# Patient Record
Sex: MALE | Race: OTHER | HISPANIC OR LATINO | URBAN - METROPOLITAN AREA
[De-identification: names, ages, dates, MRNs, and addresses within clinical notes are randomized per-mention and may not be internally consistent; named-entity substitution may affect disease eponyms.]

---

## 2017-01-05 ENCOUNTER — OUTPATIENT (OUTPATIENT)
Dept: OUTPATIENT SERVICES | Facility: HOSPITAL | Age: 67
LOS: 1 days | End: 2017-01-05
Payer: COMMERCIAL

## 2017-01-05 VITALS
TEMPERATURE: 98 F | DIASTOLIC BLOOD PRESSURE: 100 MMHG | SYSTOLIC BLOOD PRESSURE: 150 MMHG | RESPIRATION RATE: 18 BRPM | HEIGHT: 71 IN | WEIGHT: 195.99 LBS | HEART RATE: 63 BPM

## 2017-01-05 DIAGNOSIS — M25.562 PAIN IN LEFT KNEE: ICD-10-CM

## 2017-01-05 DIAGNOSIS — Z85.89 PERSONAL HISTORY OF MALIGNANT NEOPLASM OF OTHER ORGANS AND SYSTEMS: Chronic | ICD-10-CM

## 2017-01-05 DIAGNOSIS — S83.249A OTHER TEAR OF MEDIAL MENISCUS, CURRENT INJURY, UNSPECIFIED KNEE, INITIAL ENCOUNTER: ICD-10-CM

## 2017-01-05 DIAGNOSIS — R06.83 SNORING: ICD-10-CM

## 2017-01-05 DIAGNOSIS — Z01.818 ENCOUNTER FOR OTHER PREPROCEDURAL EXAMINATION: ICD-10-CM

## 2017-01-05 DIAGNOSIS — C44.91 BASAL CELL CARCINOMA OF SKIN, UNSPECIFIED: Chronic | ICD-10-CM

## 2017-01-05 DIAGNOSIS — I10 ESSENTIAL (PRIMARY) HYPERTENSION: ICD-10-CM

## 2017-01-05 LAB
BUN SERPL-MCNC: 24 MG/DL — HIGH (ref 7–23)
CALCIUM SERPL-MCNC: 9.7 MG/DL — SIGNIFICANT CHANGE UP (ref 8.4–10.5)
CHLORIDE SERPL-SCNC: 100 MMOL/L — SIGNIFICANT CHANGE UP (ref 98–107)
CO2 SERPL-SCNC: 27 MMOL/L — SIGNIFICANT CHANGE UP (ref 22–31)
CREAT SERPL-MCNC: 1.03 MG/DL — SIGNIFICANT CHANGE UP (ref 0.5–1.3)
GLUCOSE SERPL-MCNC: 85 MG/DL — SIGNIFICANT CHANGE UP (ref 70–99)
HCT VFR BLD CALC: 47.1 % — SIGNIFICANT CHANGE UP (ref 39–50)
HGB BLD-MCNC: 15.9 G/DL — SIGNIFICANT CHANGE UP (ref 13–17)
MCHC RBC-ENTMCNC: 30.1 PG — SIGNIFICANT CHANGE UP (ref 27–34)
MCHC RBC-ENTMCNC: 33.8 % — SIGNIFICANT CHANGE UP (ref 32–36)
MCV RBC AUTO: 89 FL — SIGNIFICANT CHANGE UP (ref 80–100)
PLATELET # BLD AUTO: 274 K/UL — SIGNIFICANT CHANGE UP (ref 150–400)
PMV BLD: 10.9 FL — SIGNIFICANT CHANGE UP (ref 7–13)
POTASSIUM SERPL-MCNC: 3.6 MMOL/L — SIGNIFICANT CHANGE UP (ref 3.5–5.3)
POTASSIUM SERPL-SCNC: 3.6 MMOL/L — SIGNIFICANT CHANGE UP (ref 3.5–5.3)
RBC # BLD: 5.29 M/UL — SIGNIFICANT CHANGE UP (ref 4.2–5.8)
RBC # FLD: 12.8 % — SIGNIFICANT CHANGE UP (ref 10.3–14.5)
SODIUM SERPL-SCNC: 142 MMOL/L — SIGNIFICANT CHANGE UP (ref 135–145)
WBC # BLD: 6.81 K/UL — SIGNIFICANT CHANGE UP (ref 3.8–10.5)
WBC # FLD AUTO: 6.81 K/UL — SIGNIFICANT CHANGE UP (ref 3.8–10.5)

## 2017-01-05 PROCEDURE — 93010 ELECTROCARDIOGRAM REPORT: CPT

## 2017-01-05 NOTE — H&P PST ADULT - PROBLEM SELECTOR PLAN 1
scheduled for left knee arthroscopy on 1/17/17.  labs pending   EKG in chart.   Preop instructions provided to pt.

## 2017-01-05 NOTE — H&P PST ADULT - HISTORY OF PRESENT ILLNESS
66 yrs old male with h/o left medial meniscus tear presents for preop eval to have left knee arthroscopy on 1/17/17 66 yrs old male with h/o left medial meniscus tear presents for preop eval to have left knee arthroscopy on 1/17/17. Pt stated that he has been having pain for > 6 months and so went to ortho had MRI that showed medial meniscus tear and so arthroscopy was recommended.

## 2017-01-05 NOTE — H&P PST ADULT - NSANTHOSAYNRD_GEN_A_CORE
No. LATANYA screening performed.  STOP BANG Legend: 0-2 = LOW Risk; 3-4 = INTERMEDIATE Risk; 5-8 = HIGH Risk

## 2017-01-05 NOTE — H&P PST ADULT - NEGATIVE ENMT SYMPTOMS
no nose bleeds/no nasal obstruction/no dysphagia/no hearing difficulty/no vertigo/no gum bleeding/no dry mouth/no post-nasal discharge/no throat pain/no abnormal taste sensation/no nasal congestion/no ear pain/no tinnitus/no nasal discharge

## 2017-01-05 NOTE — H&P PST ADULT - PROBLEM SELECTOR PLAN 2
pt's bp in /100.  pt denies c/o chest pain or SOB.  Pt advised to obtain cardiac clearance from pt's cardiologist prior to surgery - will obtain copy

## 2017-01-05 NOTE — H&P PST ADULT - ASSESSMENT
66 yrs old male with h/o left medial meniscus tear presents for preop eval to have left knee arthroscopy on 1/17/17.

## 2017-01-05 NOTE — H&P PST ADULT - PMH
HTN (hypertension)    Knee pain, left    Parotid mass  left - benign  Squamous cell carcinoma  scalp

## 2017-01-17 ENCOUNTER — OUTPATIENT (OUTPATIENT)
Dept: OUTPATIENT SERVICES | Facility: HOSPITAL | Age: 67
LOS: 1 days | Discharge: HOME | End: 2017-01-17

## 2017-01-17 VITALS
HEIGHT: 71 IN | DIASTOLIC BLOOD PRESSURE: 95 MMHG | SYSTOLIC BLOOD PRESSURE: 159 MMHG | HEART RATE: 54 BPM | OXYGEN SATURATION: 98 % | RESPIRATION RATE: 20 BRPM | TEMPERATURE: 98 F | WEIGHT: 195.99 LBS

## 2017-01-17 VITALS
RESPIRATION RATE: 15 BRPM | OXYGEN SATURATION: 96 % | SYSTOLIC BLOOD PRESSURE: 124 MMHG | HEART RATE: 50 BPM | DIASTOLIC BLOOD PRESSURE: 73 MMHG

## 2017-01-17 DIAGNOSIS — S83.249A OTHER TEAR OF MEDIAL MENISCUS, CURRENT INJURY, UNSPECIFIED KNEE, INITIAL ENCOUNTER: ICD-10-CM

## 2017-01-17 DIAGNOSIS — Z85.89 PERSONAL HISTORY OF MALIGNANT NEOPLASM OF OTHER ORGANS AND SYSTEMS: Chronic | ICD-10-CM

## 2017-01-17 RX ORDER — ASPIRIN/CALCIUM CARB/MAGNESIUM 324 MG
1 TABLET ORAL
Qty: 30 | Refills: 0
Start: 2017-01-17 | End: 2017-02-16

## 2017-01-17 NOTE — ASU DISCHARGE PLAN (ADULT/PEDIATRIC). - SPECIAL INSTRUCTIONS
Take over the counter stool softener to prevent constipation with narcotics   Increase fluids and fiber in diet Take over the counter stool softener to prevent constipation with narcotics   Increase fluids and fiber in diet  Can flex and extend knee as much as tolerated

## 2017-01-17 NOTE — ASU DISCHARGE PLAN (ADULT/PEDIATRIC). - ACTIVITY LEVEL
weight bearing as tolerated/exercise elevate extremity/no heavy lifting/weight bearing as tolerated/exercise/no swimming, no hot tubs no heavy lifting/no swimming, no hot tubs/elevate extremity/exercise/no tub baths/weight bearing as tolerated

## 2017-01-17 NOTE — ASU DISCHARGE PLAN (ADULT/PEDIATRIC). - MEDICATION SUMMARY - MEDICATIONS TO TAKE
I will START or STAY ON the medications listed below when I get home from the hospital:    oxyCODONE-acetaminophen 5mg-325mg oral tablet  -- 1 tab(s) by mouth every 6 hours, As Needed -for mild pain MDD:5 tablets  -- Caution federal law prohibits the transfer of this drug to any person other  than the person for whom it was prescribed.  May cause drowsiness.  Alcohol may intensify this effect.  Use care when operating dangerous machinery.  This prescription cannot be refilled.  This product contains acetaminophen.  Do not use  with any other product containing acetaminophen to prevent possible liver damage.  Using more of this medication than prescribed may cause serious breathing problems.    -- Indication: For Pain    Ecotrin 325 mg oral delayed release tablet  -- 1 tab(s) by mouth once a day for DVT prophylaxis MDD:1 tablet daily  -- Swallow whole.  Do not crush.  Take with food or milk.    -- Indication: For DVT prophylaxis

## 2017-01-17 NOTE — ASU DISCHARGE PLAN (ADULT/PEDIATRIC). - NOTIFY
Bleeding that does not stop/Persistent Nausea and Vomiting/Fever greater than 101 Pain not relieved by Medications/Swelling that continues/Bleeding that does not stop/Persistent Nausea and Vomiting/Unable to Urinate/Fever greater than 101/Inability to Tolerate Liquids or Foods

## 2018-08-06 PROBLEM — I10 ESSENTIAL (PRIMARY) HYPERTENSION: Chronic | Status: ACTIVE | Noted: 2017-01-05

## 2018-08-06 PROBLEM — M25.562 PAIN IN LEFT KNEE: Chronic | Status: ACTIVE | Noted: 2017-01-05

## 2018-08-06 PROBLEM — R22.0 LOCALIZED SWELLING, MASS AND LUMP, HEAD: Chronic | Status: ACTIVE | Noted: 2017-01-05

## 2018-08-06 PROBLEM — C80.1 MALIGNANT (PRIMARY) NEOPLASM, UNSPECIFIED: Chronic | Status: ACTIVE | Noted: 2017-01-05

## 2018-08-08 ENCOUNTER — APPOINTMENT (OUTPATIENT)
Dept: ORTHOPEDIC SURGERY | Facility: CLINIC | Age: 68
End: 2018-08-08

## 2020-11-11 ENCOUNTER — TRANSCRIPTION ENCOUNTER (OUTPATIENT)
Age: 70
End: 2020-11-11

## 2021-01-13 ENCOUNTER — TRANSCRIPTION ENCOUNTER (OUTPATIENT)
Age: 71
End: 2021-01-13

## 2022-07-12 ENCOUNTER — TRANSCRIPTION ENCOUNTER (OUTPATIENT)
Age: 72
End: 2022-07-12

## 2022-07-13 ENCOUNTER — INPATIENT (INPATIENT)
Facility: HOSPITAL | Age: 72
LOS: 15 days | Discharge: NURSING, REHAB OR LONG-TERM CARE | DRG: 908 | End: 2022-07-29
Attending: SURGERY | Admitting: SURGERY
Payer: MEDICARE

## 2022-07-13 VITALS
DIASTOLIC BLOOD PRESSURE: 69 MMHG | SYSTOLIC BLOOD PRESSURE: 113 MMHG | OXYGEN SATURATION: 98 % | RESPIRATION RATE: 30 BRPM | WEIGHT: 195.99 LBS | TEMPERATURE: 98 F | HEIGHT: 69 IN | HEART RATE: 60 BPM

## 2022-07-13 DIAGNOSIS — Z85.89 PERSONAL HISTORY OF MALIGNANT NEOPLASM OF OTHER ORGANS AND SYSTEMS: Chronic | ICD-10-CM

## 2022-07-13 DIAGNOSIS — G89.11 ACUTE PAIN DUE TO TRAUMA: ICD-10-CM

## 2022-07-13 LAB
A1C WITH ESTIMATED AVERAGE GLUCOSE RESULT: 5.4 % — SIGNIFICANT CHANGE UP (ref 4–5.6)
ALBUMIN SERPL ELPH-MCNC: 3.3 G/DL — SIGNIFICANT CHANGE UP (ref 3.3–5)
ALBUMIN SERPL ELPH-MCNC: 3.4 G/DL — SIGNIFICANT CHANGE UP (ref 3.3–5)
ALP SERPL-CCNC: 40 U/L — SIGNIFICANT CHANGE UP (ref 40–120)
ALP SERPL-CCNC: 42 U/L — SIGNIFICANT CHANGE UP (ref 40–120)
ALT FLD-CCNC: 10 U/L — SIGNIFICANT CHANGE UP (ref 10–45)
ALT FLD-CCNC: 9 U/L — LOW (ref 10–45)
ANION GAP SERPL CALC-SCNC: 12 MMOL/L — SIGNIFICANT CHANGE UP (ref 5–17)
ANION GAP SERPL CALC-SCNC: 14 MMOL/L — SIGNIFICANT CHANGE UP (ref 5–17)
ANION GAP SERPL CALC-SCNC: 14 MMOL/L — SIGNIFICANT CHANGE UP (ref 5–17)
APTT BLD: 29 SEC — SIGNIFICANT CHANGE UP (ref 27.5–35.5)
AST SERPL-CCNC: 15 U/L — SIGNIFICANT CHANGE UP (ref 10–40)
AST SERPL-CCNC: 17 U/L — SIGNIFICANT CHANGE UP (ref 10–40)
BASE EXCESS BLDA CALC-SCNC: -3 MMOL/L — LOW (ref -2–3)
BILIRUB SERPL-MCNC: 0.7 MG/DL — SIGNIFICANT CHANGE UP (ref 0.2–1.2)
BILIRUB SERPL-MCNC: 0.7 MG/DL — SIGNIFICANT CHANGE UP (ref 0.2–1.2)
BLD GP AB SCN SERPL QL: NEGATIVE — SIGNIFICANT CHANGE UP
BUN SERPL-MCNC: 28 MG/DL — HIGH (ref 7–23)
BUN SERPL-MCNC: 29 MG/DL — HIGH (ref 7–23)
BUN SERPL-MCNC: 31 MG/DL — HIGH (ref 7–23)
CALCIUM SERPL-MCNC: 7.6 MG/DL — LOW (ref 8.4–10.5)
CALCIUM SERPL-MCNC: 8 MG/DL — LOW (ref 8.4–10.5)
CALCIUM SERPL-MCNC: 8 MG/DL — LOW (ref 8.4–10.5)
CHLORIDE SERPL-SCNC: 108 MMOL/L — SIGNIFICANT CHANGE UP (ref 96–108)
CHLORIDE SERPL-SCNC: 109 MMOL/L — HIGH (ref 96–108)
CHLORIDE SERPL-SCNC: 109 MMOL/L — HIGH (ref 96–108)
CK SERPL-CCNC: 393 U/L — HIGH (ref 30–200)
CK SERPL-CCNC: 421 U/L — HIGH (ref 30–200)
CO2 BLDA-SCNC: 24 MMOL/L — SIGNIFICANT CHANGE UP (ref 19–24)
CO2 SERPL-SCNC: 20 MMOL/L — LOW (ref 22–31)
CREAT SERPL-MCNC: 1.29 MG/DL — SIGNIFICANT CHANGE UP (ref 0.5–1.3)
CREAT SERPL-MCNC: 1.35 MG/DL — HIGH (ref 0.5–1.3)
CREAT SERPL-MCNC: 1.37 MG/DL — HIGH (ref 0.5–1.3)
EGFR: 56 ML/MIN/1.73M2 — LOW
EGFR: 57 ML/MIN/1.73M2 — LOW
EGFR: 60 ML/MIN/1.73M2 — SIGNIFICANT CHANGE UP
ESTIMATED AVERAGE GLUCOSE: 108 MG/DL — SIGNIFICANT CHANGE UP (ref 68–114)
GAS PNL BLDA: SIGNIFICANT CHANGE UP
GLUCOSE SERPL-MCNC: 137 MG/DL — HIGH (ref 70–99)
GLUCOSE SERPL-MCNC: 150 MG/DL — HIGH (ref 70–99)
GLUCOSE SERPL-MCNC: 153 MG/DL — HIGH (ref 70–99)
HCO3 BLDA-SCNC: 23 MMOL/L — SIGNIFICANT CHANGE UP (ref 21–28)
HCT VFR BLD CALC: 29.2 % — LOW (ref 39–50)
HCT VFR BLD CALC: 29.9 % — LOW (ref 39–50)
HCT VFR BLD CALC: 32.4 % — LOW (ref 39–50)
HGB BLD-MCNC: 10.7 G/DL — LOW (ref 13–17)
HGB BLD-MCNC: 9.6 G/DL — LOW (ref 13–17)
HGB BLD-MCNC: 9.9 G/DL — LOW (ref 13–17)
INR BLD: 1.07 RATIO — SIGNIFICANT CHANGE UP (ref 0.88–1.16)
INR BLD: 1.14 RATIO — SIGNIFICANT CHANGE UP (ref 0.88–1.16)
MAGNESIUM SERPL-MCNC: 1.6 MG/DL — SIGNIFICANT CHANGE UP (ref 1.6–2.6)
MAGNESIUM SERPL-MCNC: 3 MG/DL — HIGH (ref 1.6–2.6)
MCHC RBC-ENTMCNC: 30.1 PG — SIGNIFICANT CHANGE UP (ref 27–34)
MCHC RBC-ENTMCNC: 30.3 PG — SIGNIFICANT CHANGE UP (ref 27–34)
MCHC RBC-ENTMCNC: 30.3 PG — SIGNIFICANT CHANGE UP (ref 27–34)
MCHC RBC-ENTMCNC: 32.9 GM/DL — SIGNIFICANT CHANGE UP (ref 32–36)
MCHC RBC-ENTMCNC: 33 GM/DL — SIGNIFICANT CHANGE UP (ref 32–36)
MCHC RBC-ENTMCNC: 33.1 GM/DL — SIGNIFICANT CHANGE UP (ref 32–36)
MCV RBC AUTO: 90.9 FL — SIGNIFICANT CHANGE UP (ref 80–100)
MCV RBC AUTO: 91.8 FL — SIGNIFICANT CHANGE UP (ref 80–100)
MCV RBC AUTO: 92.1 FL — SIGNIFICANT CHANGE UP (ref 80–100)
NRBC # BLD: 0 /100 WBCS — SIGNIFICANT CHANGE UP (ref 0–0)
PCO2 BLDA: 42 MMHG — SIGNIFICANT CHANGE UP (ref 35–48)
PH BLDA: 7.34 — LOW (ref 7.35–7.45)
PHOSPHATE SERPL-MCNC: 3.7 MG/DL — SIGNIFICANT CHANGE UP (ref 2.5–4.5)
PHOSPHATE SERPL-MCNC: 3.7 MG/DL — SIGNIFICANT CHANGE UP (ref 2.5–4.5)
PLATELET # BLD AUTO: 292 K/UL — SIGNIFICANT CHANGE UP (ref 150–400)
PLATELET # BLD AUTO: 312 K/UL — SIGNIFICANT CHANGE UP (ref 150–400)
PLATELET # BLD AUTO: 314 K/UL — SIGNIFICANT CHANGE UP (ref 150–400)
PO2 BLDA: 113 MMHG — HIGH (ref 83–108)
POTASSIUM SERPL-MCNC: 4.3 MMOL/L — SIGNIFICANT CHANGE UP (ref 3.5–5.3)
POTASSIUM SERPL-MCNC: 4.4 MMOL/L — SIGNIFICANT CHANGE UP (ref 3.5–5.3)
POTASSIUM SERPL-MCNC: 4.5 MMOL/L — SIGNIFICANT CHANGE UP (ref 3.5–5.3)
POTASSIUM SERPL-SCNC: 4.3 MMOL/L — SIGNIFICANT CHANGE UP (ref 3.5–5.3)
POTASSIUM SERPL-SCNC: 4.4 MMOL/L — SIGNIFICANT CHANGE UP (ref 3.5–5.3)
POTASSIUM SERPL-SCNC: 4.5 MMOL/L — SIGNIFICANT CHANGE UP (ref 3.5–5.3)
PROT SERPL-MCNC: 5.5 G/DL — LOW (ref 6–8.3)
PROT SERPL-MCNC: 5.7 G/DL — LOW (ref 6–8.3)
PROTHROM AB SERPL-ACNC: 12.4 SEC — SIGNIFICANT CHANGE UP (ref 10.5–13.4)
PROTHROM AB SERPL-ACNC: 13.2 SEC — SIGNIFICANT CHANGE UP (ref 10.5–13.4)
RBC # BLD: 3.17 M/UL — LOW (ref 4.2–5.8)
RBC # BLD: 3.29 M/UL — LOW (ref 4.2–5.8)
RBC # BLD: 3.53 M/UL — LOW (ref 4.2–5.8)
RBC # FLD: 14.3 % — SIGNIFICANT CHANGE UP (ref 10.3–14.5)
RBC # FLD: 14.4 % — SIGNIFICANT CHANGE UP (ref 10.3–14.5)
RBC # FLD: 14.6 % — HIGH (ref 10.3–14.5)
RH IG SCN BLD-IMP: POSITIVE — SIGNIFICANT CHANGE UP
SAO2 % BLDA: 99 % — HIGH (ref 94–98)
SARS-COV-2 RNA SPEC QL NAA+PROBE: SIGNIFICANT CHANGE UP
SODIUM SERPL-SCNC: 140 MMOL/L — SIGNIFICANT CHANGE UP (ref 135–145)
SODIUM SERPL-SCNC: 143 MMOL/L — SIGNIFICANT CHANGE UP (ref 135–145)
SODIUM SERPL-SCNC: 143 MMOL/L — SIGNIFICANT CHANGE UP (ref 135–145)
WBC # BLD: 14.18 K/UL — HIGH (ref 3.8–10.5)
WBC # BLD: 15.08 K/UL — HIGH (ref 3.8–10.5)
WBC # BLD: 17.82 K/UL — HIGH (ref 3.8–10.5)
WBC # FLD AUTO: 14.18 K/UL — HIGH (ref 3.8–10.5)
WBC # FLD AUTO: 15.08 K/UL — HIGH (ref 3.8–10.5)
WBC # FLD AUTO: 17.82 K/UL — HIGH (ref 3.8–10.5)

## 2022-07-13 PROCEDURE — 11011 DEBRIDE SKIN MUSC AT FX SITE: CPT

## 2022-07-13 PROCEDURE — 99223 1ST HOSP IP/OBS HIGH 75: CPT

## 2022-07-13 PROCEDURE — 73130 X-RAY EXAM OF HAND: CPT | Mod: 26,RT

## 2022-07-13 PROCEDURE — 73090 X-RAY EXAM OF FOREARM: CPT | Mod: 26,LT

## 2022-07-13 PROCEDURE — 73110 X-RAY EXAM OF WRIST: CPT | Mod: 26,RT

## 2022-07-13 PROCEDURE — 71045 X-RAY EXAM CHEST 1 VIEW: CPT | Mod: 26

## 2022-07-13 PROCEDURE — 99222 1ST HOSP IP/OBS MODERATE 55: CPT | Mod: 25

## 2022-07-13 PROCEDURE — 73070 X-RAY EXAM OF ELBOW: CPT | Mod: 26,RT

## 2022-07-13 RX ORDER — PROPOFOL 10 MG/ML
50 INJECTION, EMULSION INTRAVENOUS
Qty: 1000 | Refills: 0 | Status: DISCONTINUED | OUTPATIENT
Start: 2022-07-13 | End: 2022-07-13

## 2022-07-13 RX ORDER — HYDROMORPHONE HYDROCHLORIDE 2 MG/ML
0.5 INJECTION INTRAMUSCULAR; INTRAVENOUS; SUBCUTANEOUS ONCE
Refills: 0 | Status: DISCONTINUED | OUTPATIENT
Start: 2022-07-13 | End: 2022-07-13

## 2022-07-13 RX ORDER — CALCIUM GLUCONATE 100 MG/ML
2 VIAL (ML) INTRAVENOUS ONCE
Refills: 0 | Status: COMPLETED | OUTPATIENT
Start: 2022-07-13 | End: 2022-07-13

## 2022-07-13 RX ORDER — SODIUM CHLORIDE 9 MG/ML
1000 INJECTION, SOLUTION INTRAVENOUS
Refills: 0 | Status: DISCONTINUED | OUTPATIENT
Start: 2022-07-13 | End: 2022-07-13

## 2022-07-13 RX ORDER — MAGNESIUM SULFATE 500 MG/ML
2 VIAL (ML) INJECTION ONCE
Refills: 0 | Status: COMPLETED | OUTPATIENT
Start: 2022-07-13 | End: 2022-07-13

## 2022-07-13 RX ORDER — CHLORHEXIDINE GLUCONATE 213 G/1000ML
15 SOLUTION TOPICAL EVERY 12 HOURS
Refills: 0 | Status: DISCONTINUED | OUTPATIENT
Start: 2022-07-13 | End: 2022-07-13

## 2022-07-13 RX ORDER — AMPICILLIN SODIUM AND SULBACTAM SODIUM 250; 125 MG/ML; MG/ML
INJECTION, POWDER, FOR SUSPENSION INTRAMUSCULAR; INTRAVENOUS
Refills: 0 | Status: DISCONTINUED | OUTPATIENT
Start: 2022-07-13 | End: 2022-07-13

## 2022-07-13 RX ORDER — AMPICILLIN SODIUM AND SULBACTAM SODIUM 250; 125 MG/ML; MG/ML
1.5 INJECTION, POWDER, FOR SUSPENSION INTRAMUSCULAR; INTRAVENOUS ONCE
Refills: 0 | Status: COMPLETED | OUTPATIENT
Start: 2022-07-13 | End: 2022-07-13

## 2022-07-13 RX ORDER — FENTANYL CITRATE 50 UG/ML
1 INJECTION INTRAVENOUS
Qty: 5000 | Refills: 0 | Status: DISCONTINUED | OUTPATIENT
Start: 2022-07-13 | End: 2022-07-13

## 2022-07-13 RX ORDER — PANTOPRAZOLE SODIUM 20 MG/1
40 TABLET, DELAYED RELEASE ORAL DAILY
Refills: 0 | Status: DISCONTINUED | OUTPATIENT
Start: 2022-07-13 | End: 2022-07-13

## 2022-07-13 RX ORDER — AMPICILLIN SODIUM AND SULBACTAM SODIUM 250; 125 MG/ML; MG/ML
1.5 INJECTION, POWDER, FOR SUSPENSION INTRAMUSCULAR; INTRAVENOUS EVERY 6 HOURS
Refills: 0 | Status: DISCONTINUED | OUTPATIENT
Start: 2022-07-13 | End: 2022-07-27

## 2022-07-13 RX ORDER — ENOXAPARIN SODIUM 100 MG/ML
40 INJECTION SUBCUTANEOUS EVERY 24 HOURS
Refills: 0 | Status: DISCONTINUED | OUTPATIENT
Start: 2022-07-13 | End: 2022-07-13

## 2022-07-13 RX ORDER — AMPICILLIN SODIUM AND SULBACTAM SODIUM 250; 125 MG/ML; MG/ML
1.5 INJECTION, POWDER, FOR SUSPENSION INTRAMUSCULAR; INTRAVENOUS EVERY 6 HOURS
Refills: 0 | Status: DISCONTINUED | OUTPATIENT
Start: 2022-07-13 | End: 2022-07-13

## 2022-07-13 RX ORDER — PROPOFOL 10 MG/ML
50.06 INJECTION, EMULSION INTRAVENOUS
Qty: 1000 | Refills: 0 | Status: DISCONTINUED | OUTPATIENT
Start: 2022-07-13 | End: 2022-07-13

## 2022-07-13 RX ORDER — CHLORHEXIDINE GLUCONATE 213 G/1000ML
1 SOLUTION TOPICAL
Refills: 0 | Status: DISCONTINUED | OUTPATIENT
Start: 2022-07-13 | End: 2022-07-13

## 2022-07-13 RX ORDER — ACETAMINOPHEN 500 MG
1000 TABLET ORAL EVERY 6 HOURS
Refills: 0 | Status: DISCONTINUED | OUTPATIENT
Start: 2022-07-13 | End: 2022-07-13

## 2022-07-13 RX ORDER — CHLORHEXIDINE GLUCONATE 213 G/1000ML
1 SOLUTION TOPICAL
Refills: 0 | Status: DISCONTINUED | OUTPATIENT
Start: 2022-07-13 | End: 2022-07-17

## 2022-07-13 RX ORDER — ACETAMINOPHEN 500 MG
1000 TABLET ORAL EVERY 6 HOURS
Refills: 0 | Status: DISCONTINUED | OUTPATIENT
Start: 2022-07-14 | End: 2022-07-14

## 2022-07-13 RX ORDER — HYDROMORPHONE HYDROCHLORIDE 2 MG/ML
0.5 INJECTION INTRAMUSCULAR; INTRAVENOUS; SUBCUTANEOUS
Refills: 0 | Status: DISCONTINUED | OUTPATIENT
Start: 2022-07-13 | End: 2022-07-13

## 2022-07-13 RX ORDER — ENOXAPARIN SODIUM 100 MG/ML
40 INJECTION SUBCUTANEOUS EVERY 24 HOURS
Refills: 0 | Status: DISCONTINUED | OUTPATIENT
Start: 2022-07-13 | End: 2022-07-27

## 2022-07-13 RX ADMIN — HYDROMORPHONE HYDROCHLORIDE 0.5 MILLIGRAM(S): 2 INJECTION INTRAMUSCULAR; INTRAVENOUS; SUBCUTANEOUS at 11:00

## 2022-07-13 RX ADMIN — FENTANYL CITRATE 4.45 MICROGRAM(S)/KG/HR: 50 INJECTION INTRAVENOUS at 07:53

## 2022-07-13 RX ADMIN — Medication 25 GRAM(S): at 11:28

## 2022-07-13 RX ADMIN — HYDROMORPHONE HYDROCHLORIDE 0.5 MILLIGRAM(S): 2 INJECTION INTRAMUSCULAR; INTRAVENOUS; SUBCUTANEOUS at 10:45

## 2022-07-13 RX ADMIN — CHLORHEXIDINE GLUCONATE 1 APPLICATION(S): 213 SOLUTION TOPICAL at 11:29

## 2022-07-13 RX ADMIN — HYDROMORPHONE HYDROCHLORIDE 0.5 MILLIGRAM(S): 2 INJECTION INTRAMUSCULAR; INTRAVENOUS; SUBCUTANEOUS at 21:40

## 2022-07-13 RX ADMIN — Medication 25 GRAM(S): at 09:30

## 2022-07-13 RX ADMIN — PANTOPRAZOLE SODIUM 40 MILLIGRAM(S): 20 TABLET, DELAYED RELEASE ORAL at 11:29

## 2022-07-13 RX ADMIN — Medication 400 MILLIGRAM(S): at 13:25

## 2022-07-13 RX ADMIN — HYDROMORPHONE HYDROCHLORIDE 0.5 MILLIGRAM(S): 2 INJECTION INTRAMUSCULAR; INTRAVENOUS; SUBCUTANEOUS at 06:32

## 2022-07-13 RX ADMIN — Medication 400 MILLIGRAM(S): at 19:50

## 2022-07-13 RX ADMIN — HYDROMORPHONE HYDROCHLORIDE 0.5 MILLIGRAM(S): 2 INJECTION INTRAMUSCULAR; INTRAVENOUS; SUBCUTANEOUS at 07:17

## 2022-07-13 RX ADMIN — Medication 200 GRAM(S): at 08:51

## 2022-07-13 RX ADMIN — HYDROMORPHONE HYDROCHLORIDE 0.5 MILLIGRAM(S): 2 INJECTION INTRAMUSCULAR; INTRAVENOUS; SUBCUTANEOUS at 21:25

## 2022-07-13 RX ADMIN — PROPOFOL 26.7 MICROGRAM(S)/KG/MIN: 10 INJECTION, EMULSION INTRAVENOUS at 13:39

## 2022-07-13 RX ADMIN — SODIUM CHLORIDE 125 MILLILITER(S): 9 INJECTION, SOLUTION INTRAVENOUS at 18:46

## 2022-07-13 RX ADMIN — SODIUM CHLORIDE 125 MILLILITER(S): 9 INJECTION, SOLUTION INTRAVENOUS at 08:51

## 2022-07-13 RX ADMIN — PROPOFOL 26.7 MICROGRAM(S)/KG/MIN: 10 INJECTION, EMULSION INTRAVENOUS at 11:29

## 2022-07-13 RX ADMIN — HYDROMORPHONE HYDROCHLORIDE 0.5 MILLIGRAM(S): 2 INJECTION INTRAMUSCULAR; INTRAVENOUS; SUBCUTANEOUS at 07:32

## 2022-07-13 RX ADMIN — AMPICILLIN SODIUM AND SULBACTAM SODIUM 100 GRAM(S): 250; 125 INJECTION, POWDER, FOR SUSPENSION INTRAMUSCULAR; INTRAVENOUS at 10:03

## 2022-07-13 RX ADMIN — Medication 1000 MILLIGRAM(S): at 13:40

## 2022-07-13 RX ADMIN — HYDROMORPHONE HYDROCHLORIDE 0.5 MILLIGRAM(S): 2 INJECTION INTRAMUSCULAR; INTRAVENOUS; SUBCUTANEOUS at 06:50

## 2022-07-13 RX ADMIN — PROPOFOL 26.7 MICROGRAM(S)/KG/MIN: 10 INJECTION, EMULSION INTRAVENOUS at 08:51

## 2022-07-13 RX ADMIN — ENOXAPARIN SODIUM 40 MILLIGRAM(S): 100 INJECTION SUBCUTANEOUS at 11:28

## 2022-07-13 RX ADMIN — Medication 1000 MILLIGRAM(S): at 20:05

## 2022-07-13 RX ADMIN — AMPICILLIN SODIUM AND SULBACTAM SODIUM 100 GRAM(S): 250; 125 INJECTION, POWDER, FOR SUSPENSION INTRAMUSCULAR; INTRAVENOUS at 23:02

## 2022-07-13 NOTE — H&P ADULT - NSHPPHYSICALEXAM_GEN_ALL_CORE
PHYSICAL EXAM  GENERAL: sedated  CHEST: nonlabored, no increased WOB, intubated on vent  ABDOMEN: Soft, Nontender, Nondistended. Incision sites clean, dry with no erythema or induration.  EXTREMITIES: R upper arm with incision closed with staples on the lateral surface, incision clean, dry, and intact  R forearm with large wound oozing small amounts of venous blood, palpable radial and ulnar pulses  NERVOUS SYSTEM:  Alert & Oriented X3

## 2022-07-13 NOTE — H&P ADULT - NSICDXPASTMEDICALHX_GEN_ALL_CORE_FT
PAST MEDICAL HISTORY:  HTN (hypertension)     Knee pain, left     Parotid mass left - benign    Squamous cell carcinoma scalp

## 2022-07-13 NOTE — CONSULT NOTE ADULT - ASSESSMENT
ASSESSMENT:  Mr. Sunshine is a 72 y/o M who presented from an OSH after a procedure for traumatic fall, now under SICU care for management of vent and pending closure of the R forearm wound.    Neuro:  - Assess neurological status every 1hour  - Sedation to RASS 1- to 0 while intubated w/ Fentanyl and Propofol  - Multimodal pain control w/ IV Tylentol and Dilaudid PRN    MSK:  - Plan to go to OR today with trauma team for washout +/- debridement  - f/u CPK results s/p extremity injury  - X-rays of the R extremity all negative for fracture     Resp:  - Mechanical ventilation on PRVC 500/12/5/30  - Consider extubation pending post-op disposition     CVS: no acute issues    GI: no acute issues  - NPO pending procedure  - Bowel regimen with senna & Miralax  - Protonix for stress ulcer prophylaxis while intubated/ npo    Renal: no acute issues  - Monitor I&Os and electrolytes  - replete electrolytes as needed   - On  mL/hr    Heme: no acute issues  - Monitor CBC and coags  -  VTE prophylaxis: Lovenox 40mg qd  -SCD's    ID: no acute issues  - Monitor for clinical evidence of active infection  - Monitor WBC and temperature  - Antibiotcis: Ampicillin/Sulbactam    Endo: hx of DM, Hypothyroidism  - Monitor glucose of bmp  - f/u HgbA1C

## 2022-07-13 NOTE — CONSULT NOTE ADULT - ATTENDING COMMENTS
Limited history  71y Male s/p  fall from ladder and his R arm landed  underwent a bypass procedure for one of the arteries in his arm and anterior fasciotomy at [facility] transferred here intubated without any transfer note including OP note.     Sedated with Propofol drip, will add Fentanyl drip to decrease Prop requirement  Vent adjustment for resp acidosis  NP on softer side  NPO, IVF, will check CPK   Monitor mild met acidosis  Pulses in LUE +, ext warm, exposed antecubital muscles dusky, start abx Unasyn, For possible wash out of open wound with debridement as needed and closure with wound vac  BS controlled  Pharm DVT ppx, will monitor HCT    Spoke to his daughter and son  Discussed with Dr Higgins

## 2022-07-13 NOTE — CONSULT NOTE ADULT - SUBJECTIVE AND OBJECTIVE BOX
HISTORY OF PRESENT ILLNESS:    KRYSTLE BAIRES is a 71y Male who presents to us as a transfer from [facility] s/p unclear procedure after a fall. According to the patient's family who were at bedside to give collateral, on 7/12 the patient fell from a ladder and his R arm landed on rebar. The fall was witnessed and the patient reportedly did not hit his head or experience LOC. According to his family the patient underwent a bypass procedure for one of the arteries in his arm, and according to forms that came alongside the patient from [facility], he had been consented for a wound exploration with possible angio. Prior to his procedure, the patient did not endorse any loss of sensation or motor function in his right extremity. The patient arrived to the SICU from the OSH while intubated.     PAST MEDICAL HISTORY: HTN (hypertension)    Knee pain, left    Basal cell carcinoma    Squamous cell carcinoma    Parotid mass        PAST SURGICAL HISTORY: No significant past surgical history    Basal cell carcinoma    H/O squamous cell carcinoma        FAMILY HISTORY: Family history of diabetes mellitus (Mother)        SOCIAL HISTORY:    CODE STATUS:     HOME MEDICATIONS:    Amlodipine 10 mg PO qd    Valsartan-HCTZ 325-25 mg PO qd    ALLERGIES: No Known Allergies      VITAL SIGNS:  ICU Vital Signs Last 24 Hrs  T(C): 37 (13 Jul 2022 11:00), Max: 37.1 (13 Jul 2022 07:00)  T(F): 98.6 (13 Jul 2022 11:00), Max: 98.8 (13 Jul 2022 07:00)  HR: 56 (13 Jul 2022 13:00) (56 - 67)  BP: 100/54 (13 Jul 2022 13:00) (97/55 - 117/60)  BP(mean): 73 (13 Jul 2022 13:00) (71 - 86)  ABP: --  ABP(mean): --  RR: 18 (13 Jul 2022 13:00) (15 - 30)  SpO2: 97% (13 Jul 2022 13:00) (96% - 100%)    O2 Parameters below as of 13 Jul 2022 09:00  Patient On (Oxygen Delivery Method): ventilator    O2 Concentration (%): 30        NEURO  Exam:   intubated and sedated on Fentanyl and propofol  Moves all extremities to pain stimulation  R arm radial and ulnar pulse palpable  Receiving Tylenol IV and Dilaudid PRN    MSK:  R upper arm with incision closed with staples on the lateral surface, incision clean, dry, and intact  R forearm with large wound oozing small amounts of venous blood    RESPIRATORY  Mechanical Ventilation: Mode: AC/ CMV (Assist Control/ Continuous Mandatory Ventilation), RR (machine): 18, RR (patient): 18, TV (machine): 500, FiO2: 40, PEEP: 5, ITime: 0.9, MAP: 8, PIP: 17  ABG - ( 13 Jul 2022 06:30 )  pH: 7.32  /  pCO2: 38    /  pO2: 150   / HCO3: 20    / Base Excess: -6.0  /  SaO2: 99.6      Exam: Comfortable on RA      CARDIOVASCULAR    Exam: extremities warm and palpable radial and ulnar pulses bilaterally      GI/NUTRITION  Exam:  Diet: NPO pending procedure  pantoprazole  Injectable 40 milliGRAM(s) IV Push daily      GENITOURINARY/RENAL  lactated ringers. 1000 milliLiter(s) IV Continuous <Continuous>      07-12 @ 07:01  -  07-13 @ 07:00  --------------------------------------------------------  IN:    FentaNYL: 4.4 mL    Propofol: 10.7 mL  Total IN: 15.1 mL    OUT:    Indwelling Catheter - Urethral (mL): 185 mL    VAC (Vacuum Assisted Closure) System (mL): 250 mL  Total OUT: 435 mL    Total NET: -419.9 mL      07-13 @ 07:01  -  07-13 @ 13:55  --------------------------------------------------------  IN:    FentaNYL: 26.4 mL    IV PiggyBack: 250 mL    Lactated Ringers: 750 mL    Propofol: 74.7 mL  Total IN: 1101.1 mL    OUT:    Indwelling Catheter - Urethral (mL): 465 mL  Total OUT: 465 mL    Total NET: 636.1 mL        Weight (kg): 88.9 (07-13 @ 06:04)  07-13    143  |  109<H>  |  31<H>  ----------------------------<  150<H>  4.4   |  20<L>  |  1.37<H>    Ca    7.6<L>      13 Jul 2022 07:18  Phos  3.7     07-13  Mg     1.6     07-13    TPro  5.5<L>  /  Alb  3.4  /  TBili  0.7  /  DBili  x   /  AST  15  /  ALT  10  /  AlkPhos  42  07-13    [X ] Grimes catheter, indication: urine output monitoring in critically ill patient    HEMATOLOGIC  [X] VTE Prophylaxis:  enoxaparin Injectable 40 milliGRAM(s) SubCutaneous every 24 hours                          10.7   17.82 )-----------( 314      ( 13 Jul 2022 07:45 )             32.4     PT/INR - ( 13 Jul 2022 07:46 )   PT: 13.2 sec;   INR: 1.14 ratio           Transfusion: [ ] PRBC	[ ] Platelets	[ ] FFP	[ ] Cryoprecipitate      INFECTIOUS DISEASES  ampicillin/sulbactam  IVPB      ampicillin/sulbactam  IVPB 1.5 Gram(s) IV Intermittent every 6 hours    RECENT CULTURES:      ENDOCRINE    CAPILLARY BLOOD GLUCOSE          PATIENT CARE ACCESS DEVICES:  [ ] Peripheral IV  [ ] Central Venous Line	[ ] R	[ ] L	[ ] IJ	[ ] Fem	[ ] SC	Placed:   [ ] Arterial Line		[ ] R	[ ] L	[ ] Fem	[ ] Rad	[ ] Ax	Placed:   [ ] PICC:					[ ] Mediport  [ ] Urinary Catheter, Date Placed:   [x] Necessity of urinary, arterial, and venous catheters discussed    OTHER MEDICATIONS: chlorhexidine 0.12% Liquid 15 milliLiter(s) Oral Mucosa every 12 hours  chlorhexidine 2% Cloths 1 Application(s) Topical <User Schedule>      IMAGING STUDIES:  Outside CT Chest/Abdomen/Pelvis were read as negative    X-ray of R elbow, forearm, wrist, and hand all negative for fracture

## 2022-07-13 NOTE — CONSULT NOTE ADULT - SUBJECTIVE AND OBJECTIVE BOX
Called this morning to see this intubated 72 yo R hand dominant M transferred from [facility] for critical care evaluation at 6AM this morning. 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 I also spoke with his son Delgado (telephone) who witnessed the event. His father did not strike his head, but his R arm was impaled on a piece of rebar and the patient had to be lifted from it. He then presented to [facility] per family report and was taken to the OR within 1 hour for a "bypass". They also report that prior to intubation, that the patient did not report any AROM/motor deficits or sensory deficits R hand. They were also told that there were "no fractures". The son at the scene reported that his father's did not appear frankly ischemic (blue or white) at any point in time. The critical care team is also attempting to obtain clinical information from the OSH including the op note from vascular surgery, x-ray reports and progress notes. The patient may also be weaned to extubate today. He was not started on AC post-operatively by the team at [facility]. x-rays are pending.    PMH: hypertension   All: NKDA   SH: no tobacco, no ETOH     PE:   gen: patient intubated and sedated but responds to commands  ext: R hand and forearm with soft compartments; R hand pink and warm; +doppler signals at brachial, ulnar, radial arteries as well as superficial and deep palmar arches; +stapled wound proximal to the elbow ulnarly; +open, extensive wound (possible volar fasciotomy site as well as traumatic wound) from volar wrist to elbow; +surgicel v. dusky muscle bellies mid-forearm (FDS); no gross contamination visible in wound; no dorsal forearm or hand lacerations; no gross evidence of infection     XR: hand/wrist/forearm/elbow pending     A+P: R upper extremity wound/volar likely fasciotomy site s/p fall onto rebar with OR at OSH and clinical information pending     -no urgent surgical indications as no evidence of compartment syndrome or bleeding   -open, volar forearm wound clean - will re-examine in AM; if wound remains clean and muscles viable would VAC. Today would continue current dressing with xeroform, saline-soaked 4x4' guaze, glory and ACE Q 4 hours    -vascular surgery consultation   -elevation R upper extremity  -plan for thorough hand exam once patient is extubated  -repeat x-rays R hand, wrist, forearm and elbow  -would continue IV Unasyn now and would consider more broad-spectrum antibiotics secondary to mechanism of injury   -clinical information pending from OSH including op report, pre-intubation or pre-op hand exam; SICU team attempting to obtain this information to clarify operative intervention   -I spoke to the family at length at the bedside and they also examined his arm with me. They understand the severity of the injury as well as the likely need for future operative intervention for wound management and wound closure. X-rays are pending to r/o fractures and operative fractures. They also understand that I will continue to follow the muscles at the mid, volar forearm for any additional concern for muscle ischemia and possible indications for future debridement or second-look operation/wash out if medically cleared by trauma team  -please call [phone] with any acute changes in exam and when operative report is available        Called this morning to see this intubated 70 yo R hand dominant M transferred from [facility] for critical care evaluation at 6AM this morning. 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 I also spoke with his son Delgado (telephone) who witnessed the event. His father did not strike his head, but his R arm was impaled on a piece of rebar and the patient had to be lifted from it. He then presented to [facility] per family report and was taken to the OR within 1 hour for a "bypass". They also report that prior to intubation, that the patient did not report any AROM/motor deficits or sensory deficits R hand. They were also told that there were "no fractures". The son at the scene reported that his father's did not appear frankly ischemic (blue or white) at any point in time. The critical care team is also attempting to obtain clinical information from the OSH including the op note from vascular surgery, x-ray reports and progress notes. The patient may also be weaned to extubate today. He was not started on AC post-operatively by the team at [facility]. x-rays are pending.    PMH: hypertension   All: NKDA   SH: no tobacco, no ETOH     PE:   gen: patient intubated and sedated but responds to commands  ext: R hand and forearm with soft compartments; R hand pink and warm; +doppler signals at brachial, ulnar, radial arteries as well as superficial and deep palmar arches; +stapled wound proximal to the elbow ulnarly; +open, extensive wound (possible volar fasciotomy site as well as traumatic wound) from volar wrist to elbow; +surgicel v. dusky muscle bellies mid-forearm (FDS); no gross contamination visible in wound; no dorsal forearm or hand lacerations; no gross evidence of infection     XR: hand/wrist/forearm/elbow pending     A+P: R upper extremity wound/volar likely fasciotomy site s/p fall onto rebar with OR at OSH and clinical information pending     -no urgent surgical indications as no evidence of compartment syndrome or bleeding   -open, volar forearm wound clean - will re-examine in AM; if wound remains clean and muscles viable would VAC. Today would continue current dressing with xeroform, saline-soaked 4x4' guaze, glory and ACE Q 4 hours    -vascular surgery consultation   -elevation R upper extremity  -plan for thorough hand exam once patient is extubated  -repeat x-rays R hand, wrist, forearm and elbow  -would continue IV Unasyn now and would consider more broad-spectrum antibiotics secondary to mechanism of injury   -clinical information pending from OSH including op report, pre-intubation or pre-op hand exam; SICU team attempting to obtain this information to clarify operative intervention   -I spoke to the family at length at the bedside and they also examined his arm with me. They understand the severity of the injury as well as the likely need for future operative intervention for wound management and wound closure. X-rays are pending to r/o fractures and operative fractures. They also understand that I will continue to follow the muscles at the mid, volar forearm for any additional concern for muscle ischemia and possible indications for future debridement or second-look operation/wash out if medically cleared by trauma team  -please call [phone] with any acute changes in exam and when operative report is available        Called this morning to see this intubated 70 yo R hand dominant M transferred from [facility] for critical care evaluation at 6AM this morning. 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 I also spoke with his son Delgado (telephone) who witnessed the event. His father did not strike his head, but his R arm was impaled on a piece of rebar and the patient had to be lifted from it. He then presented to [facility] per family report and was taken to the OR within 1 hour for a "bypass". They also report that prior to intubation, that the patient did not report any AROM/motor deficits or sensory deficits R hand. They were also told that there were "no fractures". The son at the scene reported that his father's did not appear frankly ischemic (blue or white) at any point in time. The critical care team is also attempting to obtain clinical information from the OSH including the op note from vascular surgery, x-ray reports and progress notes. The patient may also be weaned to extubate today. He was not started on AC post-operatively by the team at [facility]. x-rays are pending.    PMH: hypertension   All: NKDA   SH: no tobacco, no ETOH     PE:   gen: patient intubated and sedated but responds to commands  ext: R hand and forearm with soft compartments; R hand pink and warm; +doppler signals at brachial, ulnar, radial arteries as well as superficial and deep palmar arches; +stapled wound proximal to the elbow ulnarly; +open, extensive wound (possible volar fasciotomy site as well as traumatic wound) from volar wrist to elbow; +surgicel v. dusky muscle bellies mid-forearm (FDS); no gross contamination visible in wound; no dorsal forearm or hand lacerations; no gross evidence of infection     XR: hand/wrist/forearm/elbow pending     A+P: R upper extremity wound/volar forearm likely fasciotomy site s/p fall onto rebar with OR at OSH and clinical information pending     -no urgent surgical indications as no evidence of compartment syndrome or bleeding   -open, volar forearm wound clean - will re-examine in AM; if wound remains clean and muscles viable would VAC. Today would continue current dressing with xeroform, saline-soaked 4x4' guaze, glory and ACE Q 4 hours    -vascular surgery consultation   -elevation R upper extremity  -plan for thorough hand exam once patient is extubated  -repeat x-rays R hand, wrist, forearm and elbow  -would continue IV Unasyn now and would consider more broad-spectrum antibiotics secondary to mechanism of injury   -clinical information pending from OSH including op report, pre-intubation or pre-op hand exam; SICU team attempting to obtain this information to clarify operative intervention   -I spoke to the family at length at the bedside and they also examined his arm with me. They understand the severity of the injury as well as the likely need for future operative intervention for wound management and wound closure. X-rays are pending to r/o fractures and operative fractures. They also understand that I will continue to follow the muscles at the mid, volar forearm for any additional concern for muscle ischemia and possible indications for future debridement or second-look operation/wash out if medically cleared by trauma team  -please call [phone] with any acute changes in exam and when operative report is available

## 2022-07-13 NOTE — BRIEF OPERATIVE NOTE - NSICDXBRIEFPOSTOP_GEN_ALL_CORE_FT
POST-OP DIAGNOSIS:  Open wound of arm, right, initial encounter 13-Jul-2022 19:16:27  Delgado Cole

## 2022-07-13 NOTE — H&P ADULT - HISTORY OF PRESENT ILLNESS
71y Male presents as transfer from [facility] s/p unclear procedure after a fall. According to the patient's family who were at bedside to give collateral, on 7/12 the patient fell from a ladder and his R arm landed on rebar. The fall was witnessed and the patient reportedly did not hit his head or experience LOC. According to his family the patient underwent a bypass procedure for one of the arteries in his arm, and according to forms that came alongside the patient from [facility], he had been consented for a wound exploration with possible angio. Prior to his procedure, the patient did not endorse any loss of sensation or motor function in his right extremity. The patient arrived to the SICU from the OSH while intubated.

## 2022-07-13 NOTE — H&P ADULT - NSHPLABSRESULTS_GEN_ALL_CORE
.  LABS:                         9.6    15.08 )-----------( 292      ( 13 Jul 2022 14:06 )             29.2     07-13    143  |  109<H>  |  28<H>  ----------------------------<  137<H>  4.3   |  20<L>  |  1.29    Ca    8.0<L>      13 Jul 2022 14:06  Phos  3.7     07-13  Mg     3.0     07-13    TPro  5.5<L>  /  Alb  3.4  /  TBili  0.7  /  DBili  x   /  AST  15  /  ALT  10  /  AlkPhos  42  07-13    PT/INR - ( 13 Jul 2022 14:07 )   PT: 12.4 sec;   INR: 1.07 ratio         PTT - ( 13 Jul 2022 14:07 )  PTT:29.0 sec          RADIOLOGY, EKG & ADDITIONAL TESTS: Reviewed.

## 2022-07-13 NOTE — CHART NOTE - NSCHARTNOTEFT_GEN_A_CORE
Reviewed medical records from [facility]    Patient is a 71 y/o male who presented on 7/12 after a 7 foot fall off a ladder. Patient's right arm was caught went to the OR on 7/12 for right arm wound exploration, debridement, right brachial artery cutdown, angiogram w/ concern for a ulnar artery dissection, interposition bypass w/ cephalic vein graft, and wound VAC placement. EBL was 600 mL. Reviewed medical records from [facility]    Patient is a 73 y/o male who presented on 7/12 after a 7 foot fall off a ladder. Patient's right arm was caught on a piece of metal during the fall, causing a severe degloving injury. Patient was hemodynamically stable in the ED at [facility]. 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 EBL was 600 mL and he received 2 units of PRBCs. Reviewed medical records from [facility]    Patient is a 73 y/o male who presented on 7/12 after a 7 foot fall off a ladder. Patient's right arm was caught on a piece of metal during the fall, causing a severe degloving injury on the volar aspect of his right arm. Patient was hemodynamically stable in the ED at [facility]. 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 EBL was 600 mL and he received 2 units of PRBCs. Patient was left intubated at the end of the case but hemodynamically stable. He was transferred to [facility] for further management.    Home Medications  - Amlodipine 10 mg PO daily  - Valsartan-hydrochlorothiazide 320 mg-25 mg PO daily Reviewed medical records from [facility]    Patient is a 71 y/o male who presented on 7/12 after a 7 foot fall off a ladder. Patient's right arm was caught on a piece of metal during the fall, causing a severe degloving injury on the volar aspect of his right arm. Patient was hemodynamically stable in the ED at [facility]. 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 EBL was 600 mL and he received 2 units of PRBCs. The ulnar & radial arteries were palpable and there was a palmar arch signal post-operatively. Patient was left intubated at the end of the case but hemodynamically stable. He was transferred to [facility] for further management. Family states that he was unable to move his right 4th & 5th digit immediately after the accident but was able to move his other three fingers and both sensation & strength were otherwise intact.    Home Medications  - Amlodipine 10 mg PO daily  - Valsartan-hydrochlorothiazide 320 mg-25 mg PO daily

## 2022-07-13 NOTE — H&P ADULT - ATTENDING COMMENTS
ATTENDING ATTESTATION  I have seen and examined this patient with the resident housestaftf. I have reviewed all labs, imaging and reports. I have participated in formulating the plan, and have read and agree with the history, ROS, exam, assessment and plan as stated above.     S/P fall from later with penetrating trauma to the right lower arm.   Underwent exporation with vascular with angiogram for cold pulseless hand.   No operative report is available at this time, but reported graft in forearm with extension of traumatic fasciotomy.   Dressing taking down, there is a lot of devitalized tissue/muscle.   Will plan for OR later on day of admission for washout/debridement and exploration.   Plastic surgery involved for possible reconstruction in the future. I will discuss my operative findings with the plastic attending.     Total time spent in the care of this patient today (excluding critical care & procedures): 55 min                 Over 50% of the total time was spent on counseling and coordination of care.     Praveena Higgins M.D., M.S.  Dept of Trauma, Emergency General Surgery and Critical Care

## 2022-07-14 LAB
ALBUMIN SERPL ELPH-MCNC: 3.3 G/DL — SIGNIFICANT CHANGE UP (ref 3.3–5)
ALP SERPL-CCNC: 42 U/L — SIGNIFICANT CHANGE UP (ref 40–120)
ALT FLD-CCNC: 11 U/L — SIGNIFICANT CHANGE UP (ref 10–45)
ANION GAP SERPL CALC-SCNC: 9 MMOL/L — SIGNIFICANT CHANGE UP (ref 5–17)
APTT BLD: 27.2 SEC — LOW (ref 27.5–35.5)
AST SERPL-CCNC: 17 U/L — SIGNIFICANT CHANGE UP (ref 10–40)
BILIRUB SERPL-MCNC: 0.4 MG/DL — SIGNIFICANT CHANGE UP (ref 0.2–1.2)
BUN SERPL-MCNC: 26 MG/DL — HIGH (ref 7–23)
CALCIUM SERPL-MCNC: 7.9 MG/DL — LOW (ref 8.4–10.5)
CHLORIDE SERPL-SCNC: 110 MMOL/L — HIGH (ref 96–108)
CO2 SERPL-SCNC: 23 MMOL/L — SIGNIFICANT CHANGE UP (ref 22–31)
CREAT SERPL-MCNC: 1.22 MG/DL — SIGNIFICANT CHANGE UP (ref 0.5–1.3)
EGFR: 64 ML/MIN/1.73M2 — SIGNIFICANT CHANGE UP
GAS PNL BLDV: SIGNIFICANT CHANGE UP
GLUCOSE SERPL-MCNC: 154 MG/DL — HIGH (ref 70–99)
HCT VFR BLD CALC: 28.3 % — LOW (ref 39–50)
HCV AB S/CO SERPL IA: 0.08 S/CO — SIGNIFICANT CHANGE UP (ref 0–0.99)
HCV AB SERPL-IMP: SIGNIFICANT CHANGE UP
HGB BLD-MCNC: 9.4 G/DL — LOW (ref 13–17)
INR BLD: 1.06 RATIO — SIGNIFICANT CHANGE UP (ref 0.88–1.16)
MAGNESIUM SERPL-MCNC: 2.5 MG/DL — SIGNIFICANT CHANGE UP (ref 1.6–2.6)
MCHC RBC-ENTMCNC: 30.1 PG — SIGNIFICANT CHANGE UP (ref 27–34)
MCHC RBC-ENTMCNC: 33.2 GM/DL — SIGNIFICANT CHANGE UP (ref 32–36)
MCV RBC AUTO: 90.7 FL — SIGNIFICANT CHANGE UP (ref 80–100)
NRBC # BLD: 0 /100 WBCS — SIGNIFICANT CHANGE UP (ref 0–0)
PHOSPHATE SERPL-MCNC: 2.1 MG/DL — LOW (ref 2.5–4.5)
PLATELET # BLD AUTO: 291 K/UL — SIGNIFICANT CHANGE UP (ref 150–400)
POTASSIUM SERPL-MCNC: 4.2 MMOL/L — SIGNIFICANT CHANGE UP (ref 3.5–5.3)
POTASSIUM SERPL-SCNC: 4.2 MMOL/L — SIGNIFICANT CHANGE UP (ref 3.5–5.3)
PROT SERPL-MCNC: 5.9 G/DL — LOW (ref 6–8.3)
PROTHROM AB SERPL-ACNC: 12.3 SEC — SIGNIFICANT CHANGE UP (ref 10.5–13.4)
RBC # BLD: 3.12 M/UL — LOW (ref 4.2–5.8)
RBC # FLD: 14.6 % — HIGH (ref 10.3–14.5)
SODIUM SERPL-SCNC: 142 MMOL/L — SIGNIFICANT CHANGE UP (ref 135–145)
WBC # BLD: 14.7 K/UL — HIGH (ref 3.8–10.5)
WBC # FLD AUTO: 14.7 K/UL — HIGH (ref 3.8–10.5)

## 2022-07-14 PROCEDURE — 99233 SBSQ HOSP IP/OBS HIGH 50: CPT

## 2022-07-14 PROCEDURE — 73206 CT ANGIO UPR EXTRM W/O&W/DYE: CPT | Mod: 26,RT

## 2022-07-14 RX ORDER — OXYCODONE HYDROCHLORIDE 5 MG/1
5 TABLET ORAL EVERY 4 HOURS
Refills: 0 | Status: DISCONTINUED | OUTPATIENT
Start: 2022-07-14 | End: 2022-07-21

## 2022-07-14 RX ORDER — SENNA PLUS 8.6 MG/1
2 TABLET ORAL AT BEDTIME
Refills: 0 | Status: DISCONTINUED | OUTPATIENT
Start: 2022-07-14 | End: 2022-07-27

## 2022-07-14 RX ORDER — AMLODIPINE BESYLATE 2.5 MG/1
10 TABLET ORAL DAILY
Refills: 0 | Status: DISCONTINUED | OUTPATIENT
Start: 2022-07-14 | End: 2022-07-14

## 2022-07-14 RX ORDER — ACETAMINOPHEN 500 MG
650 TABLET ORAL EVERY 6 HOURS
Refills: 0 | Status: DISCONTINUED | OUTPATIENT
Start: 2022-07-14 | End: 2022-07-27

## 2022-07-14 RX ORDER — METOPROLOL TARTRATE 50 MG
25 TABLET ORAL EVERY 12 HOURS
Refills: 0 | Status: DISCONTINUED | OUTPATIENT
Start: 2022-07-14 | End: 2022-07-15

## 2022-07-14 RX ORDER — HYDROMORPHONE HYDROCHLORIDE 2 MG/ML
0.5 INJECTION INTRAMUSCULAR; INTRAVENOUS; SUBCUTANEOUS
Refills: 0 | Status: DISCONTINUED | OUTPATIENT
Start: 2022-07-14 | End: 2022-07-18

## 2022-07-14 RX ORDER — POLYETHYLENE GLYCOL 3350 17 G/17G
17 POWDER, FOR SOLUTION ORAL DAILY
Refills: 0 | Status: DISCONTINUED | OUTPATIENT
Start: 2022-07-14 | End: 2022-07-15

## 2022-07-14 RX ADMIN — HYDROMORPHONE HYDROCHLORIDE 0.5 MILLIGRAM(S): 2 INJECTION INTRAMUSCULAR; INTRAVENOUS; SUBCUTANEOUS at 20:20

## 2022-07-14 RX ADMIN — AMPICILLIN SODIUM AND SULBACTAM SODIUM 100 GRAM(S): 250; 125 INJECTION, POWDER, FOR SUSPENSION INTRAMUSCULAR; INTRAVENOUS at 05:13

## 2022-07-14 RX ADMIN — HYDROMORPHONE HYDROCHLORIDE 0.5 MILLIGRAM(S): 2 INJECTION INTRAMUSCULAR; INTRAVENOUS; SUBCUTANEOUS at 11:06

## 2022-07-14 RX ADMIN — ENOXAPARIN SODIUM 40 MILLIGRAM(S): 100 INJECTION SUBCUTANEOUS at 11:17

## 2022-07-14 RX ADMIN — AMPICILLIN SODIUM AND SULBACTAM SODIUM 100 GRAM(S): 250; 125 INJECTION, POWDER, FOR SUSPENSION INTRAMUSCULAR; INTRAVENOUS at 17:13

## 2022-07-14 RX ADMIN — Medication 255 MILLIMOLE(S): at 03:52

## 2022-07-14 RX ADMIN — OXYCODONE HYDROCHLORIDE 5 MILLIGRAM(S): 5 TABLET ORAL at 11:04

## 2022-07-14 RX ADMIN — OXYCODONE HYDROCHLORIDE 5 MILLIGRAM(S): 5 TABLET ORAL at 05:00

## 2022-07-14 RX ADMIN — POLYETHYLENE GLYCOL 3350 17 GRAM(S): 17 POWDER, FOR SOLUTION ORAL at 07:40

## 2022-07-14 RX ADMIN — CHLORHEXIDINE GLUCONATE 1 APPLICATION(S): 213 SOLUTION TOPICAL at 05:13

## 2022-07-14 RX ADMIN — Medication 400 MILLIGRAM(S): at 07:35

## 2022-07-14 RX ADMIN — AMPICILLIN SODIUM AND SULBACTAM SODIUM 100 GRAM(S): 250; 125 INJECTION, POWDER, FOR SUSPENSION INTRAMUSCULAR; INTRAVENOUS at 11:18

## 2022-07-14 RX ADMIN — OXYCODONE HYDROCHLORIDE 5 MILLIGRAM(S): 5 TABLET ORAL at 03:59

## 2022-07-14 RX ADMIN — Medication 400 MILLIGRAM(S): at 02:35

## 2022-07-14 RX ADMIN — Medication 650 MILLIGRAM(S): at 17:12

## 2022-07-14 RX ADMIN — Medication 25 MILLIGRAM(S): at 17:08

## 2022-07-14 RX ADMIN — Medication 1000 MILLIGRAM(S): at 07:50

## 2022-07-14 RX ADMIN — SENNA PLUS 2 TABLET(S): 8.6 TABLET ORAL at 23:06

## 2022-07-14 RX ADMIN — HYDROMORPHONE HYDROCHLORIDE 0.5 MILLIGRAM(S): 2 INJECTION INTRAMUSCULAR; INTRAVENOUS; SUBCUTANEOUS at 20:35

## 2022-07-14 RX ADMIN — HYDROMORPHONE HYDROCHLORIDE 0.5 MILLIGRAM(S): 2 INJECTION INTRAMUSCULAR; INTRAVENOUS; SUBCUTANEOUS at 10:51

## 2022-07-14 RX ADMIN — OXYCODONE HYDROCHLORIDE 5 MILLIGRAM(S): 5 TABLET ORAL at 17:12

## 2022-07-14 RX ADMIN — AMPICILLIN SODIUM AND SULBACTAM SODIUM 100 GRAM(S): 250; 125 INJECTION, POWDER, FOR SUSPENSION INTRAMUSCULAR; INTRAVENOUS at 23:07

## 2022-07-14 RX ADMIN — Medication 650 MILLIGRAM(S): at 17:42

## 2022-07-14 RX ADMIN — OXYCODONE HYDROCHLORIDE 5 MILLIGRAM(S): 5 TABLET ORAL at 10:34

## 2022-07-14 RX ADMIN — OXYCODONE HYDROCHLORIDE 5 MILLIGRAM(S): 5 TABLET ORAL at 17:42

## 2022-07-14 RX ADMIN — Medication 255 MILLIMOLE(S): at 02:35

## 2022-07-14 NOTE — PROGRESS NOTE ADULT - ASSESSMENT
Mr. Sunshine is a 70 y/o M who presented from an OSH after a procedure for traumatic fall, now under SICU care for management of vent and pending closure of the R forearm wound.    Neuro: acute postop pain control, right hand wound s/p washout today  - Assess neurological status every 1 hour   - Multimodal pain control w/ IV Tylentol and Dilaudid PRN    MSK: s/p  washout +/- debridement  - f/u CPK results s/p extremity injury  - X-rays of the R extremity all negative for fracture     Resp: extubated, no RA, no acute issue  - monitor RR and SpO2    CVS: no acute issues    GI: no acute issues  - regular diet  - Bowel regimen with senna & Miralax      Renal: no acute issues  - Monitor I&Os and electrolytes  - replete electrolytes as needed   - IVL    Heme: no acute issues  - Monitor CBC and coags  -  VTE prophylaxis: Lovenox 40mg qd  -SCD's    ID: open wound in right arm, elevated WBC   - Monitor for clinical evidence of active infection  - Monitor WBC and temperature  - Antibiotcis: Ampicillin/Sulbactam    Endo: hx of DM, Hypothyroidism  - Monitor glucose of bmp  - f/u HgbA1C

## 2022-07-14 NOTE — OCCUPATIONAL THERAPY INITIAL EVALUATION ADULT - RANGE OF MOTION, PT EVAL
GOAL: Pt will increase AAROM of R digit,wrist and elbow to WFL to increase ability to participate in ADLs in 4 weeks.

## 2022-07-14 NOTE — PHYSICAL THERAPY INITIAL EVALUATION ADULT - ACTIVE RANGE OF MOTION EXAMINATION, REHAB EVAL
r elbow and shoulder WFL/Left UE Active ROM was WFL (within functional limits)/bilateral  lower extremity Active ROM was WFL (within functional limits)

## 2022-07-14 NOTE — OCCUPATIONAL THERAPY INITIAL EVALUATION ADULT - MANUAL MUSCLE TESTING RESULTS, REHAB EVAL
R shoulder 3/5, R elbow/wrist NT 2* to pain and ace bandage limitations, R thumb abduction 3/5, R thumb adduction 2-/5, Digits 2-5 extension 2-/5, flexion 2/5, LUE 3+/5, BLE at least 3/5/grossly assessed due to

## 2022-07-14 NOTE — OCCUPATIONAL THERAPY INITIAL EVALUATION ADULT - LIVES WITH, PROFILE
lives alone in apt no stairs to enter in [place], has private home in  with  who can assist with ADLs as needed

## 2022-07-14 NOTE — PATIENT PROFILE ADULT - FALL HARM RISK - HARM RISK INTERVENTIONS

## 2022-07-14 NOTE — PROGRESS NOTE ADULT - SUBJECTIVE AND OBJECTIVE BOX
Plastic Surgery    SUBJECTIVE: Pt seen and examined on rounds with team. No acute events overnight.        OBJECTIVE    PHYSICAL EXAM:   General: NAD, Lying in bed   Neuro: Awake and alert  Extremities:   R arm: In ACE wrap, no visible strikethrough or bleeding across dressing.      VITALS  T(C): 36.7 (07-14-22 @ 03:00), Max: 37.2 (07-13-22 @ 17:30)  HR: 68 (07-14-22 @ 06:00) (54 - 96)  BP: 111/65 (07-14-22 @ 06:00) (97/55 - 159/67)  RR: 13 (07-14-22 @ 06:00) (10 - 24)  SpO2: 93% (07-14-22 @ 06:00) (90% - 100%)  CAPILLARY BLOOD GLUCOSE          Is/Os    07-12 @ 07:01  -  07-13 @ 07:00  --------------------------------------------------------  IN:    FentaNYL: 4.4 mL    Propofol: 10.7 mL  Total IN: 15.1 mL    OUT:    Indwelling Catheter - Urethral (mL): 185 mL    VAC (Vacuum Assisted Closure) System (mL): 250 mL  Total OUT: 435 mL    Total NET: -419.9 mL      07-13 @ 07:01  -  07-14 @ 06:52  --------------------------------------------------------  IN:    FentaNYL: 35.2 mL    IV PiggyBack: 1100 mL    Lactated Ringers: 625 mL    Lactated Ringers: 1000 mL    Propofol: 98.7 mL  Total IN: 2858.9 mL    OUT:    Indwelling Catheter - Urethral (mL): 2040 mL    Nasogastric/Oral tube (mL): 10 mL  Total OUT: 2050 mL    Total NET: 808.9 mL          MEDICATIONS (STANDING): acetaminophen   IVPB .. 1000 milliGRAM(s) IV Intermittent every 6 hours  ampicillin/sulbactam  IVPB 1.5 Gram(s) IV Intermittent every 6 hours  enoxaparin Injectable 40 milliGRAM(s) SubCutaneous every 24 hours  polyethylene glycol 3350 17 Gram(s) Oral daily  senna 2 Tablet(s) Oral at bedtime    MEDICATIONS (PRN):oxyCODONE    IR 5 milliGRAM(s) Oral every 4 hours PRN Moderate to Severe Pain (4 - 10)      LABS  CBC (07-14 @ 01:20)                              9.4<L>                         14.70<H>  )----------------(  291        --    % Neutrophils, --    % Lymphocytes, ANC: --                                  28.3<L>  CBC (07-13 @ 18:02)                              9.9<L>                         14.18<H>  )----------------(  312        --    % Neutrophils, --    % Lymphocytes, ANC: --                                  29.9<L>    BMP (07-14 @ 01:19)             142     |  110<H>  |  26<H> 		Ca++ --      Ca 7.9<L>             ---------------------------------( 154<H>		Mg 2.5                4.2     |  23      |  1.22  			Ph 2.1<L>  BMP (07-13 @ 18:02)             140     |  108     |  29<H> 		Ca++ --      Ca 8.0<L>             ---------------------------------( 153<H>		Mg --                 4.5     |  20<L>   |  1.35<H>			Ph --        LFTs (07-14 @ 01:19)      TPro 5.9<L> / Alb 3.3 / TBili 0.4 / DBili -- / AST 17 / ALT 11 / AlkPhos 42  LFTs (07-13 @ 18:02)      TPro 5.7<L> / Alb 3.3 / TBili 0.7 / DBili -- / AST 17 / ALT 9<L> / AlkPhos 40    Coags (07-14 @ 01:19)  aPTT 27.2<L> / INR 1.06 / PT 12.3  Coags (07-13 @ 14:07)  aPTT 29.0 / INR 1.07 / PT 12.4    Cardiac Markers (07-13 @ 18:02)     HSTrop: -- / CKMB: -- / CK: 421  Cardiac Markers (07-13 @ 14:06)     HSTrop: -- / CKMB: -- / CK: 393    ABG (07-13 @ 17:50)     7.34<L> / 39 / 91 / 21 / -4.4<L> / 98.0%     Lactate:    ABG (07-13 @ 13:50)     7.34<L> / 42 / 113<H> / 23 / -3.0<L> / 99.0<H>%     Lactate:      VBG (07-14 @ 00:45)     7.36 / 43 / 49<H> / 24 / -1.2 / 83.3%     Lactate: 2.1<H>      IMAGING STUDIES     Plastic Surgery    SUBJECTIVE: Pt seen and examined on rounds with team. No acute events overnight.        OBJECTIVE    PHYSICAL EXAM:   General: NAD, Lying in bed   Neuro: Awake and alert  Extremities:   R arm: In ACE wrap, no visible strikethrough or bleeding across dressing. Digits WWP, Sensation intact to light touch over Radial, median, and ulnar nerve distributions at the fingers and hand. Global active movement of wrist, hand, fingers limited 2/2 to pain. passively, full ROM.       VITALS  T(C): 36.7 (07-14-22 @ 03:00), Max: 37.2 (07-13-22 @ 17:30)  HR: 68 (07-14-22 @ 06:00) (54 - 96)  BP: 111/65 (07-14-22 @ 06:00) (97/55 - 159/67)  RR: 13 (07-14-22 @ 06:00) (10 - 24)  SpO2: 93% (07-14-22 @ 06:00) (90% - 100%)  CAPILLARY BLOOD GLUCOSE          Is/Os    07-12 @ 07:01  -  07-13 @ 07:00  --------------------------------------------------------  IN:    FentaNYL: 4.4 mL    Propofol: 10.7 mL  Total IN: 15.1 mL    OUT:    Indwelling Catheter - Urethral (mL): 185 mL    VAC (Vacuum Assisted Closure) System (mL): 250 mL  Total OUT: 435 mL    Total NET: -419.9 mL      07-13 @ 07:01  -  07-14 @ 06:52  --------------------------------------------------------  IN:    FentaNYL: 35.2 mL    IV PiggyBack: 1100 mL    Lactated Ringers: 625 mL    Lactated Ringers: 1000 mL    Propofol: 98.7 mL  Total IN: 2858.9 mL    OUT:    Indwelling Catheter - Urethral (mL): 2040 mL    Nasogastric/Oral tube (mL): 10 mL  Total OUT: 2050 mL    Total NET: 808.9 mL          MEDICATIONS (STANDING): acetaminophen   IVPB .. 1000 milliGRAM(s) IV Intermittent every 6 hours  ampicillin/sulbactam  IVPB 1.5 Gram(s) IV Intermittent every 6 hours  enoxaparin Injectable 40 milliGRAM(s) SubCutaneous every 24 hours  polyethylene glycol 3350 17 Gram(s) Oral daily  senna 2 Tablet(s) Oral at bedtime    MEDICATIONS (PRN):oxyCODONE    IR 5 milliGRAM(s) Oral every 4 hours PRN Moderate to Severe Pain (4 - 10)      LABS  CBC (07-14 @ 01:20)                              9.4<L>                         14.70<H>  )----------------(  291        --    % Neutrophils, --    % Lymphocytes, ANC: --                                  28.3<L>  CBC (07-13 @ 18:02)                              9.9<L>                         14.18<H>  )----------------(  312        --    % Neutrophils, --    % Lymphocytes, ANC: --                                  29.9<L>    BMP (07-14 @ 01:19)             142     |  110<H>  |  26<H> 		Ca++ --      Ca 7.9<L>             ---------------------------------( 154<H>		Mg 2.5                4.2     |  23      |  1.22  			Ph 2.1<L>  BMP (07-13 @ 18:02)             140     |  108     |  29<H> 		Ca++ --      Ca 8.0<L>             ---------------------------------( 153<H>		Mg --                 4.5     |  20<L>   |  1.35<H>			Ph --        LFTs (07-14 @ 01:19)      TPro 5.9<L> / Alb 3.3 / TBili 0.4 / DBili -- / AST 17 / ALT 11 / AlkPhos 42  LFTs (07-13 @ 18:02)      TPro 5.7<L> / Alb 3.3 / TBili 0.7 / DBili -- / AST 17 / ALT 9<L> / AlkPhos 40    Coags (07-14 @ 01:19)  aPTT 27.2<L> / INR 1.06 / PT 12.3  Coags (07-13 @ 14:07)  aPTT 29.0 / INR 1.07 / PT 12.4    Cardiac Markers (07-13 @ 18:02)     HSTrop: -- / CKMB: -- / CK: 421  Cardiac Markers (07-13 @ 14:06)     HSTrop: -- / CKMB: -- / CK: 393    ABG (07-13 @ 17:50)     7.34<L> / 39 / 91 / 21 / -4.4<L> / 98.0%     Lactate:    ABG (07-13 @ 13:50)     7.34<L> / 42 / 113<H> / 23 / -3.0<L> / 99.0<H>%     Lactate:      VBG (07-14 @ 00:45)     7.36 / 43 / 49<H> / 24 / -1.2 / 83.3%     Lactate: 2.1<H>      IMAGING STUDIES

## 2022-07-14 NOTE — PROGRESS NOTE ADULT - ASSESSMENT
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 Transferred to [facility] for further management. Taken to the OR by Trauma team for R forearm wound exploration and washout.     Plan:  - Appreciate excellent care and management of the SICU  - Appreciate trauma recs for dressing; f/u w/ team to visually assess wound for further workup  - Discuss case w/ Dr. Narvaez

## 2022-07-14 NOTE — CHART NOTE - NSCHARTNOTEFT_GEN_A_CORE
Surgery Post op Note    Procedure: Right forearm wound exploration and washout    SUBJECTIVE: Patient seen and evaluated at the bedside.   SOB:  [ ] YES [ ] NO  Chest Discomfort: [ ] YES [ ] NO    Nausea: [ ] YES [ ] NO           Vomiting: [ ] YES [ ] NO  Flatus: [ ] YES [ ] NO             Bowel Movement: [ ] YES [ ] NO  Void: [ ]YES [ ]No         Pain Control Adequate: [ ] YES [ ] NO    Objective:   Vital Signs Last 24 Hrs  T(C): 36.7 (14 Jul 2022 03:00), Max: 37.2 (13 Jul 2022 17:30)  T(F): 98.1 (14 Jul 2022 03:00), Max: 99 (13 Jul 2022 17:30)  HR: 70 (14 Jul 2022 03:00) (54 - 96)  BP: 130/64 (14 Jul 2022 03:00) (97/55 - 159/67)  BP(mean): 89 (14 Jul 2022 03:00) (71 - 97)  RR: 18 (14 Jul 2022 03:00) (10 - 30)  SpO2: 97% (14 Jul 2022 03:00) (90% - 100%)    Parameters below as of 13 Jul 2022 19:00  Patient On (Oxygen Delivery Method): nasal cannula  O2 Flow (L/min): 2    I&O's Summary    12 Jul 2022 07:01  -  13 Jul 2022 07:00  --------------------------------------------------------  IN: 15.1 mL / OUT: 435 mL / NET: -419.9 mL    13 Jul 2022 07:01  -  14 Jul 2022 03:45  --------------------------------------------------------  IN: 2558.9 mL / OUT: 1775 mL / NET: 783.9 mL      I&O's Detail    12 Jul 2022 07:01  -  13 Jul 2022 07:00  --------------------------------------------------------  IN:    FentaNYL: 4.4 mL    Propofol: 10.7 mL  Total IN: 15.1 mL    OUT:    Indwelling Catheter - Urethral (mL): 185 mL    VAC (Vacuum Assisted Closure) System (mL): 250 mL  Total OUT: 435 mL    Total NET: -419.9 mL      13 Jul 2022 07:01  -  14 Jul 2022 03:45  --------------------------------------------------------  IN:    FentaNYL: 35.2 mL    IV PiggyBack: 800 mL    Lactated Ringers: 625 mL    Lactated Ringers: 1000 mL    Propofol: 98.7 mL  Total IN: 2558.9 mL    OUT:    Indwelling Catheter - Urethral (mL): 1765 mL    Nasogastric/Oral tube (mL): 10 mL  Total OUT: 1775 mL    Total NET: 783.9 mL            LABS:                        9.4    14.70 )-----------( 291      ( 14 Jul 2022 01:20 )             28.3     07-14    142  |  110<H>  |  26<H>  ----------------------------<  154<H>  4.2   |  23  |  1.22    Ca    7.9<L>      14 Jul 2022 01:19  Phos  2.1     07-14  Mg     2.5     07-14    TPro  5.9<L>  /  Alb  3.3  /  TBili  0.4  /  DBili  x   /  AST  17  /  ALT  11  /  AlkPhos  42  07-14    PT/INR - ( 14 Jul 2022 01:19 )   PT: 12.3 sec;   INR: 1.06 ratio         PTT - ( 14 Jul 2022 01:19 )  PTT:27.2 sec      MEDICATIONS  (STANDING):  acetaminophen   IVPB .. 1000 milliGRAM(s) IV Intermittent every 6 hours  ampicillin/sulbactam  IVPB 1.5 Gram(s) IV Intermittent every 6 hours  chlorhexidine 2% Cloths 1 Application(s) Topical <User Schedule>  enoxaparin Injectable 40 milliGRAM(s) SubCutaneous every 24 hours  polyethylene glycol 3350 17 Gram(s) Oral daily  senna 2 Tablet(s) Oral at bedtime  sodium phosphate IVPB 15 milliMole(s) IV Intermittent every 1 hour    MEDICATIONS  (PRN):  oxyCODONE    IR 5 milliGRAM(s) Oral every 4 hours PRN Moderate to Severe Pain (4 - 10)      Physical exam  General- NAD  Abdomen- non-tender  Res- non-labored breathing on room air  CV- pulse present  Ext- right forearm wrapped in ace bandage. No signs of bleeding. No strikethrough. Motor sensation grossly intact, right side. Reduced sensation on anterior aspect of fifth digit, right side.     Assessment/Plan: 71y Male s/p Right forearm wound exploration and washout  - Diet: per SICU  - Activity- OOB with assistance  - Labs: per SICU  - Pain medication as needed   - DVT ppx  - incentive spirometry

## 2022-07-14 NOTE — OCCUPATIONAL THERAPY INITIAL EVALUATION ADULT - PERTINENT HX OF CURRENT PROBLEM, REHAB EVAL
71y Male presents as transfer from [facility] s/p unclear procedure after a fall. According to the patient's family who were at bedside to give collateral, on 7/12 the patient fell from a ladder and his R arm landed on rebar. The fall was witnessed and the patient reportedly did not hit his head or experience LOC. According to his family the patient underwent a bypass procedure for one of the arteries in his arm,

## 2022-07-14 NOTE — OCCUPATIONAL THERAPY INITIAL EVALUATION ADULT - RANGE OF MOTION EXAMINATION, UPPER EXTREMITY
PROM of Digits 2-4 MCP/DIP/PIP WFL, AROM of DIP/PIP flexion WFL, PROM of MCP flexion WFL. Position of Rest: forearm supinated, MCPs extended and DIP flexed./bilateral UE Active ROM was WFL  (within functional limits)

## 2022-07-14 NOTE — PATIENT PROFILE ADULT - FALL HARM RISK - DEVICES
CTICU  CRITICAL  CARE  attending     Hand off received 					   Pertinent clinical, laboratory, radiographic, hemodynamic, echocardiographic, respiratory data, microbiologic data and chart were reviewed and analyzed frequently throughout the course of the day and night  Patient seen and examined with CTS/ SH attending at bedside  Pt is a 49y , Female, HEALTH ISSUES - PROBLEM Dx:  Adjustment disorder with anxious mood: Adjustment disorder with anxious mood  Hyponatremia: Hyponatremia  Hypertension: Hypertension  Endocarditis: Endocarditis  Anemia: Anemia  End stage renal disease: End stage renal disease      , FAMILY HISTORY:  PAST MEDICAL & SURGICAL HISTORY:    Patient is a 49y old  Female who presents with a chief complaint of TV IE / MRSA Bacteremia. (13 Sep 2017 22:57)      14 system review was unremarkable  acute changes include acute respiratory failure  Vital signs, hemodynamic and respiratory parameters were reviewed from the bedside nursing flowsheet.  ICU Vital Signs Last 24 Hrs  T(C): 35.9 (07 Oct 2017 09:00), Max: 36.5 (06 Oct 2017 18:10)  T(F): 96.6 (07 Oct 2017 09:00), Max: 97.7 (06 Oct 2017 18:10)  HR: 44 (07 Oct 2017 16:00) (44 - 88)  BP: 110/51 (07 Oct 2017 16:00) (90/56 - 194/72)  BP(mean): 73 (07 Oct 2017 16:00) (49 - 130)  ABP: --  ABP(mean): --  RR: 16 (07 Oct 2017 16:00) (0 - 24)  SpO2: 95% (07 Oct 2017 16:00) (94% - 100%)    Adult Advanced Hemodynamics Last 24 Hrs  CVP(mm Hg): --  CVP(cm H2O): --  CO: --  CI: --  PA: --  PA(mean): --  PCWP: --  SVR: --  SVRI: --  PVR: --  PVRI: --,     Intake and output was reviewed and the fluid balance was calculated  Daily     Daily   I&O's Summary    06 Oct 2017 07:01  -  07 Oct 2017 07:00  --------------------------------------------------------  IN: 50 mL / OUT: 251 mL / NET: -201 mL    07 Oct 2017 07:01  -  07 Oct 2017 17:50  --------------------------------------------------------  IN: 60 mL / OUT: 125 mL / NET: -65 mL        All lines and drain sites were assessed  Glycemic trend was reviewedCAPILLARY BLOOD GLUCOSE  144 (07 Oct 2017 11:25)        No acute change in mental status  Auscultation of the chest reveals equal bs  Abdomen is soft  Extremities are warm and well perfused  Wounds appear clean and unremarkable  Antibiotics are periop    labs  CBC Full  -  ( 07 Oct 2017 03:32 )  WBC Count : 9.6 K/uL  Hemoglobin : 7.9 g/dL  Hematocrit : 25.2 %  Platelet Count - Automated : 257 K/uL  Mean Cell Volume : 86.0 fL  Mean Cell Hemoglobin : 27.0 pg  Mean Cell Hemoglobin Concentration : 31.3 g/dL  Auto Neutrophil # : x  Auto Lymphocyte # : x  Auto Monocyte # : x  Auto Eosinophil # : x  Auto Basophil # : x  Auto Neutrophil % : x  Auto Lymphocyte % : x  Auto Monocyte % : x  Auto Eosinophil % : x  Auto Basophil % : x    10-07    129<L>  |  91<L>  |  24<H>  ----------------------------<  149<H>  4.6   |  26  |  3.93<H>    Ca    8.8      07 Oct 2017 03:32  Phos  4.1     10-07  Mg     2.2     10-07    TPro  7.6  /  Alb  2.1<L>  /  TBili  0.5  /  DBili  x   /  AST  11  /  ALT  7<L>  /  AlkPhos  357<H>  10-06    PT/INR - ( 07 Oct 2017 03:32 )   PT: 14.8 sec;   INR: 1.33          PTT - ( 07 Oct 2017 03:32 )  PTT:33.5 sec  The current medications were reviewed   MEDICATIONS  (STANDING):  aspirin enteric coated 81 milliGRAM(s) Oral daily  DAPTOmycin IVPB 700 milliGRAM(s) IV Intermittent every 48 hours  docusate sodium 100 milliGRAM(s) Oral three times a day  epoetin fransisca Injectable 76766 Unit(s) IV Push once  gabapentin 300 milliGRAM(s) Oral at bedtime  heparin  Injectable 5000 Unit(s) SubCutaneous every 8 hours  insulin glargine Injectable (LANTUS) 15 Unit(s) SubCutaneous every morning  insulin lispro (HumaLOG) corrective regimen sliding scale   SubCutaneous Before meals and at bedtime  insulin lispro Injectable (HumaLOG) 4 Unit(s) SubCutaneous three times a day before meals  nystatin Ointment 1 Application(s) Topical two times a day  pantoprazole    Tablet 40 milliGRAM(s) Oral before breakfast  polyethylene glycol 3350 17 Gram(s) Oral daily  rifampin 600 milliGRAM(s) Oral daily  senna 2 Tablet(s) Oral at bedtime  sertraline 25 milliGRAM(s) Oral daily  simvastatin 20 milliGRAM(s) Oral at bedtime  sodium chloride 0.45%. 1000 milliLiter(s) (10 mL/Hr) IV Continuous <Continuous>  sodium chloride 0.9% lock flush 3 milliLiter(s) IV Push every 8 hours  traZODone 25 milliGRAM(s) Oral at bedtime    MEDICATIONS  (PRN):  acetaminophen    Suspension. 650 milliGRAM(s) Oral every 6 hours PRN Mild Pain (1 - 3)  oxyCODONE    5 mG/acetaminophen 325 mG 1 Tablet(s) Oral every 4 hours PRN Moderate Pain (4 - 6)       PROBLEM LIST/ ASSESSMENT:  HEALTH ISSUES - PROBLEM Dx:  Adjustment disorder with anxious mood: Adjustment disorder with anxious mood  Hyponatremia: Hyponatremia  Hypertension: Hypertension  Endocarditis: Endocarditis  Anemia: Anemia  End stage renal disease: End stage renal disease      ,   Patient is a 49y old  Female who presents with a chief complaint of TV IE / MRSA Bacteremia. (13 Sep 2017 22:57)    s/p tvr  acute changes include acute respiratory failure, bradyarrhythmia    My plan includes :  close hemodynamic, ventilatory and drain monitoring and management per post op routine    Monitor for arrhythmias and monitor parameters for organ perfusion  monitor neurologic status  Head of the bed should remain elevated to 45 deg .   chest PT and IS will be encouraged  monitor adequacy of oxygenation and ventilation and attempt to wean oxygen  Nutritional goals will be met using po eventually , ensure adequate caloric intake and montior the same  Stress ulcer and VTE prophylaxis will be achieved    Glycemic control is satisfactory  Electrolytes have been repleted as necessary and wound care has been carried out. Pain control has been achieved.   agressive physical therapy and early mobility and ambulation goals will be met   The family was updated about the course and plan  CRITICAL CARE TIME SPENT in evaluation and management, reassessments, review and interpretation of labs and x-rays, ventilator and hemodynamic management, formulating a plan and coordinating care: ___90____ MIN.  Time does not include procedural time.  CTICU ATTENDING     					    Farhat Aguillon MD None

## 2022-07-14 NOTE — PHYSICAL THERAPY INITIAL EVALUATION ADULT - NS ASR WT BEARING DETAIL RUE
Diabetic ketoacidosis without coma associated with type 1 diabetes mellitus weight-bearing as tolerated

## 2022-07-14 NOTE — PHYSICAL THERAPY INITIAL EVALUATION ADULT - PERTINENT HX OF CURRENT PROBLEM, REHAB EVAL
71y Male presents as transfer from [facility] s/p unclear procedure after a fall. According to the patient's family who were at bedside to give collateral, on 7/12 the patient fell from a ladder and his R arm landed on rebar. The fall was witnessed and the patient reportedly did not hit his head or experience LOC. According to his family the patient underwent a bypass procedure for one of the arteries in his arm, pt is s/p Right forearm wound exploration and washout

## 2022-07-14 NOTE — PROGRESS NOTE ADULT - SUBJECTIVE AND OBJECTIVE BOX
24 HOUR EVENTS:  - got operative notes from OSH, detailed in chat note  - underwent arm wound washout today, palpable radial and (+)signal on ulnar when back to sicu  - extubated and on RA now  - advanced to regular diet  - added unasyn     SUBJECTIVE/ROS:  [x] A ten-point review of systems was otherwise negative except as noted.  [ ] Due to altered mental status/intubation, subjective information were not able to be obtained from the patient. History was obtained, to the extent possible, from review of the chart and collateral sources of information.      NEURO  Exam:  A&O* 4, can not move R 4th and 5th finger, motion in R 1-3th fingers intact , sensation intact in all fingers    Meds: acetaminophen   IVPB .. 1000 milliGRAM(s) IV Intermittent every 6 hours  oxyCODONE    IR 5 milliGRAM(s) Oral every 4 hours PRN Moderate to Severe Pain (4 - 10)    RESPIRATORY  RR: 20 (07-13-22 @ 23:00) (10 - 30)  SpO2: 95% (07-13-22 @ 23:00) (90% - 100%)  Wt(kg): --  Exam: unlabored, clear to auscultation bilaterally    ABG - ( 13 Jul 2022 17:50 )  pH: 7.34  /  pCO2: 39    /  pO2: 91    / HCO3: 21    / Base Excess: -4.4  /  SaO2: 98.0    Lactate: x        Meds:       CARDIOVASCULAR  HR: 77 (07-13-22 @ 23:00) (54 - 96)  BP: 124/60 (07-13-22 @ 23:00) (97/55 - 159/67)  BP(mean): 86 (07-13-22 @ 23:00) (71 - 97)  ABP: --  ABP(mean): --  Wt(kg): --  CVP(cm H2O): --      Exam:RRR  Cardiac Rhythm:sinus  Perfusion     [x]Adequate   [ ]Inadequate  Mentation   [x]Normal       [ ]Reduced  Extremities  [x]Warm         [ ]Cool  Volume Status [ ]Hypervolemic [x]Euvolemic [ ]Hypovolemic  Meds:       GI/NUTRITION  Exam: soft, NTND  Diet: regular  Meds: polyethylene glycol 3350 17 Gram(s) Oral daily  senna 2 Tablet(s) Oral at bedtime      GENITOURINARY  I&O's Detail    07-12 @ 07:01 - 07-13 @ 07:00  --------------------------------------------------------  IN:    FentaNYL: 4.4 mL    Propofol: 10.7 mL  Total IN: 15.1 mL    OUT:    Indwelling Catheter - Urethral (mL): 185 mL    VAC (Vacuum Assisted Closure) System (mL): 250 mL  Total OUT: 435 mL    Total NET: -419.9 mL      07-13 @ 07:01 - 07-14 @ 00:43  --------------------------------------------------------  IN:    FentaNYL: 35.2 mL    IV PiggyBack: 450 mL    Lactated Ringers: 625 mL    Lactated Ringers: 1000 mL    Propofol: 98.7 mL  Total IN: 2208.9 mL    OUT:    Indwelling Catheter - Urethral (mL): 1140 mL    Nasogastric/Oral tube (mL): 10 mL  Total OUT: 1150 mL    Total NET: 1058.9 mL        Weight (kg): 88.9 (07-13 @ 15:53)  07-13    140  |  108  |  29<H>  ----------------------------<  153<H>  4.5   |  20<L>  |  1.35<H>    Ca    8.0<L>      13 Jul 2022 18:02  Phos  3.7     07-13  Mg     3.0     07-13    TPro  5.7<L>  /  Alb  3.3  /  TBili  0.7  /  DBili  x   /  AST  17  /  ALT  9<L>  /  AlkPhos  40  07-13    [ ] Grimes catheter, indication: strict I&O  Meds:       HEMATOLOGIC  Meds: enoxaparin Injectable 40 milliGRAM(s) SubCutaneous every 24 hours    [x] VTE Prophylaxis                        9.9    14.18 )-----------( 312      ( 13 Jul 2022 18:02 )             29.9     PT/INR - ( 13 Jul 2022 14:07 )   PT: 12.4 sec;   INR: 1.07 ratio         PTT - ( 13 Jul 2022 14:07 )  PTT:29.0 sec      INFECTIOUS DISEASES  T(C): 36.8 (07-13-22 @ 23:00), Max: 37.2 (07-13-22 @ 17:30)  Wt(kg): --  WBC Count: 14.18 K/uL (07-13 @ 18:02)  WBC Count: 15.08 K/uL (07-13 @ 14:06)  WBC Count: 17.82 K/uL (07-13 @ 07:45)    Recent Cultures:    Meds: ampicillin/sulbactam  IVPB 1.5 Gram(s) IV Intermittent every 6 hours        ENDOCRINE  Capillary Blood Glucose    Meds:       ACCESS DEVICES:  [x ] Peripheral IV  [ ] Central Venous Line	[ ] R	[ ] L	[ ] IJ	[ ] Fem	[ ] SC	Placed:   [ ] Arterial Line		[ ] R	[ ] L	[ ] Fem	[ ] Rad	[ ] Ax	Placed:   [ ] PICC:					[ ] Mediport  [x ] Urinary Catheter, Date Placed:   [x ] Necessity of urinary, arterial, and venous catheters discussed    OTHER MEDICATIONS:  chlorhexidine 2% Cloths 1 Application(s) Topical <User Schedule>        IMAGING: 24 HOUR EVENTS:  - got operative notes from OSH, detailed in chat note  - underwent arm wound washout today, palpable radial and (+)signal on ulnar when back to sicu  - extubated and on RA now  - advanced to regular diet  - IVL  - added unasyn     SUBJECTIVE/ROS:  [x] A ten-point review of systems was otherwise negative except as noted.  [ ] Due to altered mental status/intubation, subjective information were not able to be obtained from the patient. History was obtained, to the extent possible, from review of the chart and collateral sources of information.      NEURO  Exam:  A&O* 4, can not move R 4th and 5th finger, motion in R 1-3th fingers intact , sensation intact in all fingers    Meds: acetaminophen   IVPB .. 1000 milliGRAM(s) IV Intermittent every 6 hours  oxyCODONE    IR 5 milliGRAM(s) Oral every 4 hours PRN Moderate to Severe Pain (4 - 10)    RESPIRATORY  RR: 20 (07-13-22 @ 23:00) (10 - 30)  SpO2: 95% (07-13-22 @ 23:00) (90% - 100%)  Wt(kg): --  Exam: unlabored, clear to auscultation bilaterally    ABG - ( 13 Jul 2022 17:50 )  pH: 7.34  /  pCO2: 39    /  pO2: 91    / HCO3: 21    / Base Excess: -4.4  /  SaO2: 98.0    Lactate: x        Meds:       CARDIOVASCULAR  HR: 77 (07-13-22 @ 23:00) (54 - 96)  BP: 124/60 (07-13-22 @ 23:00) (97/55 - 159/67)  BP(mean): 86 (07-13-22 @ 23:00) (71 - 97)  ABP: --  ABP(mean): --  Wt(kg): --  CVP(cm H2O): --      Exam:RRR  Cardiac Rhythm:sinus  Perfusion     [x]Adequate   [ ]Inadequate  Mentation   [x]Normal       [ ]Reduced  Extremities  [x]Warm         [ ]Cool  Volume Status [ ]Hypervolemic [x]Euvolemic [ ]Hypovolemic  Meds:       GI/NUTRITION  Exam: soft, NTND  Diet: regular  Meds: polyethylene glycol 3350 17 Gram(s) Oral daily  senna 2 Tablet(s) Oral at bedtime      GENITOURINARY  I&O's Detail    07-12 @ 07:01  -  07-13 @ 07:00  --------------------------------------------------------  IN:    FentaNYL: 4.4 mL    Propofol: 10.7 mL  Total IN: 15.1 mL    OUT:    Indwelling Catheter - Urethral (mL): 185 mL    VAC (Vacuum Assisted Closure) System (mL): 250 mL  Total OUT: 435 mL    Total NET: -419.9 mL      07-13 @ 07:01  -  07-14 @ 00:43  --------------------------------------------------------  IN:    FentaNYL: 35.2 mL    IV PiggyBack: 450 mL    Lactated Ringers: 625 mL    Lactated Ringers: 1000 mL    Propofol: 98.7 mL  Total IN: 2208.9 mL    OUT:    Indwelling Catheter - Urethral (mL): 1140 mL    Nasogastric/Oral tube (mL): 10 mL  Total OUT: 1150 mL    Total NET: 1058.9 mL        Weight (kg): 88.9 (07-13 @ 15:53)  07-13    140  |  108  |  29<H>  ----------------------------<  153<H>  4.5   |  20<L>  |  1.35<H>    Ca    8.0<L>      13 Jul 2022 18:02  Phos  3.7     07-13  Mg     3.0     07-13    TPro  5.7<L>  /  Alb  3.3  /  TBili  0.7  /  DBili  x   /  AST  17  /  ALT  9<L>  /  AlkPhos  40  07-13    [ ] Grimes catheter, indication: strict I&O  Meds:       HEMATOLOGIC  Meds: enoxaparin Injectable 40 milliGRAM(s) SubCutaneous every 24 hours    [x] VTE Prophylaxis                        9.9    14.18 )-----------( 312      ( 13 Jul 2022 18:02 )             29.9     PT/INR - ( 13 Jul 2022 14:07 )   PT: 12.4 sec;   INR: 1.07 ratio         PTT - ( 13 Jul 2022 14:07 )  PTT:29.0 sec      INFECTIOUS DISEASES  T(C): 36.8 (07-13-22 @ 23:00), Max: 37.2 (07-13-22 @ 17:30)  Wt(kg): --  WBC Count: 14.18 K/uL (07-13 @ 18:02)  WBC Count: 15.08 K/uL (07-13 @ 14:06)  WBC Count: 17.82 K/uL (07-13 @ 07:45)    Recent Cultures:    Meds: ampicillin/sulbactam  IVPB 1.5 Gram(s) IV Intermittent every 6 hours        ENDOCRINE  Capillary Blood Glucose    Meds:       ACCESS DEVICES:  [x ] Peripheral IV  [ ] Central Venous Line	[ ] R	[ ] L	[ ] IJ	[ ] Fem	[ ] SC	Placed:   [ ] Arterial Line		[ ] R	[ ] L	[ ] Fem	[ ] Rad	[ ] Ax	Placed:   [ ] PICC:					[ ] Mediport  [x ] Urinary Catheter, Date Placed:   [x ] Necessity of urinary, arterial, and venous catheters discussed    OTHER MEDICATIONS:  chlorhexidine 2% Cloths 1 Application(s) Topical <User Schedule>        IMAGING:

## 2022-07-14 NOTE — PROGRESS NOTE ADULT - SUBJECTIVE AND OBJECTIVE BOX
SUBJECTIVE: Patient seen at beside and feels well. Denies F/C/N/V.  Pain is well controlled.     Vital Signs Last 24 Hrs  T(C): 37.2 (14 Jul 2022 11:00), Max: 37.5 (14 Jul 2022 07:00)  T(F): 99 (14 Jul 2022 11:00), Max: 99.5 (14 Jul 2022 07:00)  HR: 85 (14 Jul 2022 12:00) (54 - 96)  BP: 134/64 (14 Jul 2022 12:00) (98/53 - 170/77)  BP(mean): 91 (14 Jul 2022 12:00) (72 - 111)  RR: 22 (14 Jul 2022 12:00) (10 - 24)  SpO2: 94% (14 Jul 2022 12:00) (90% - 99%)    Parameters below as of 14 Jul 2022 07:00  Patient On (Oxygen Delivery Method): room air          PAST MEDICAL & SURGICAL HISTORY:  HTN (hypertension)      Knee pain, left      Squamous cell carcinoma  scalp      Parotid mass  left - benign      H/O squamous cell carcinoma  removed form the scalp in 2016          General Appearance: Appears well, NAD  Neck: Supple  Chest: Equal expansion bilaterally, equal breath sounds  CV: Pulse regular presently  Abdomen: Soft, nontense, appropriate incisional tenderness, dressings clean and dry and intact  Extremities: Took down RUE dressing. Wound anterior forearm extending proximal to antecubital fossa. Wound lacks skin coverage in forearm. Ulnar muscles appear somewhat dusky. SILT and good motor function radially, lacks ulnar sensation and motor function on repeat exam.     I&O's Summary    13 Jul 2022 07:01  -  14 Jul 2022 07:00  --------------------------------------------------------  IN: 2858.9 mL / OUT: 2050 mL / NET: 808.9 mL    14 Jul 2022 07:01  -  14 Jul 2022 12:14  --------------------------------------------------------  IN: 480 mL / OUT: 500 mL / NET: -20 mL      I&O's Detail    13 Jul 2022 07:01  -  14 Jul 2022 07:00  --------------------------------------------------------  IN:    FentaNYL: 35.2 mL    IV PiggyBack: 1100 mL    Lactated Ringers: 1000 mL    Lactated Ringers: 625 mL    Propofol: 98.7 mL  Total IN: 2858.9 mL    OUT:    Indwelling Catheter - Urethral (mL): 2040 mL    Nasogastric/Oral tube (mL): 10 mL  Total OUT: 2050 mL    Total NET: 808.9 mL      14 Jul 2022 07:01  -  14 Jul 2022 12:14  --------------------------------------------------------  IN:    Oral Fluid: 480 mL  Total IN: 480 mL    OUT:    Voided (mL): 500 mL  Total OUT: 500 mL    Total NET: -20 mL          MEDICATIONS  (STANDING):  ampicillin/sulbactam  IVPB 1.5 Gram(s) IV Intermittent every 6 hours  chlorhexidine 2% Cloths 1 Application(s) Topical <User Schedule>  enoxaparin Injectable 40 milliGRAM(s) SubCutaneous every 24 hours  metoprolol tartrate 25 milliGRAM(s) Oral every 12 hours  polyethylene glycol 3350 17 Gram(s) Oral daily  senna 2 Tablet(s) Oral at bedtime    MEDICATIONS  (PRN):  HYDROmorphone  Injectable 0.5 milliGRAM(s) IV Push every 3 hours PRN breakthrough pain  oxyCODONE    IR 5 milliGRAM(s) Oral every 4 hours PRN Moderate to Severe Pain (4 - 10)      LABS:                        9.4    14.70 )-----------( 291      ( 14 Jul 2022 01:20 )             28.3     07-14    142  |  110<H>  |  26<H>  ----------------------------<  154<H>  4.2   |  23  |  1.22    Ca    7.9<L>      14 Jul 2022 01:19  Phos  2.1     07-14  Mg     2.5     07-14    TPro  5.9<L>  /  Alb  3.3  /  TBili  0.4  /  DBili  x   /  AST  17  /  ALT  11  /  AlkPhos  42  07-14    PT/INR - ( 14 Jul 2022 01:19 )   PT: 12.3 sec;   INR: 1.06 ratio         PTT - ( 14 Jul 2022 01:19 )  PTT:27.2 sec

## 2022-07-14 NOTE — OCCUPATIONAL THERAPY INITIAL EVALUATION ADULT - ADDITIONAL COMMENTS
and according to forms that came alongside the patient from [facility], he had been consented for a wound exploration with possible angio. Prior to his procedure, the patient did not endorse any loss of sensation or motor function in his right extremity. The patient arrived to the SICU from the OSH while intubated.

## 2022-07-15 DIAGNOSIS — R07.9 CHEST PAIN, UNSPECIFIED: ICD-10-CM

## 2022-07-15 DIAGNOSIS — S41.101A UNSPECIFIED OPEN WOUND OF RIGHT UPPER ARM, INITIAL ENCOUNTER: ICD-10-CM

## 2022-07-15 DIAGNOSIS — Z29.9 ENCOUNTER FOR PROPHYLACTIC MEASURES, UNSPECIFIED: ICD-10-CM

## 2022-07-15 DIAGNOSIS — I10 ESSENTIAL (PRIMARY) HYPERTENSION: ICD-10-CM

## 2022-07-15 LAB
ALBUMIN SERPL ELPH-MCNC: 3.6 G/DL — SIGNIFICANT CHANGE UP (ref 3.3–5)
ALP SERPL-CCNC: 53 U/L — SIGNIFICANT CHANGE UP (ref 40–120)
ALT FLD-CCNC: 16 U/L — SIGNIFICANT CHANGE UP (ref 10–45)
ANION GAP SERPL CALC-SCNC: 9 MMOL/L — SIGNIFICANT CHANGE UP (ref 5–17)
APTT BLD: 26.7 SEC — LOW (ref 27.5–35.5)
AST SERPL-CCNC: 21 U/L — SIGNIFICANT CHANGE UP (ref 10–40)
BILIRUB SERPL-MCNC: 0.4 MG/DL — SIGNIFICANT CHANGE UP (ref 0.2–1.2)
BUN SERPL-MCNC: 20 MG/DL — SIGNIFICANT CHANGE UP (ref 7–23)
CALCIUM SERPL-MCNC: 8.2 MG/DL — LOW (ref 8.4–10.5)
CHLORIDE SERPL-SCNC: 107 MMOL/L — SIGNIFICANT CHANGE UP (ref 96–108)
CK MB CFR SERPL CALC: 2.6 NG/ML — SIGNIFICANT CHANGE UP (ref 0–6.7)
CO2 SERPL-SCNC: 26 MMOL/L — SIGNIFICANT CHANGE UP (ref 22–31)
CREAT SERPL-MCNC: 1.08 MG/DL — SIGNIFICANT CHANGE UP (ref 0.5–1.3)
EGFR: 74 ML/MIN/1.73M2 — SIGNIFICANT CHANGE UP
GLUCOSE SERPL-MCNC: 109 MG/DL — HIGH (ref 70–99)
HCT VFR BLD CALC: 29.5 % — LOW (ref 39–50)
HGB BLD-MCNC: 9.5 G/DL — LOW (ref 13–17)
INR BLD: 1.03 RATIO — SIGNIFICANT CHANGE UP (ref 0.88–1.16)
MAGNESIUM SERPL-MCNC: 2.2 MG/DL — SIGNIFICANT CHANGE UP (ref 1.6–2.6)
MCHC RBC-ENTMCNC: 29.7 PG — SIGNIFICANT CHANGE UP (ref 27–34)
MCHC RBC-ENTMCNC: 32.2 GM/DL — SIGNIFICANT CHANGE UP (ref 32–36)
MCV RBC AUTO: 92.2 FL — SIGNIFICANT CHANGE UP (ref 80–100)
NRBC # BLD: 0 /100 WBCS — SIGNIFICANT CHANGE UP (ref 0–0)
PHOSPHATE SERPL-MCNC: 1.6 MG/DL — LOW (ref 2.5–4.5)
PLATELET # BLD AUTO: 276 K/UL — SIGNIFICANT CHANGE UP (ref 150–400)
POTASSIUM SERPL-MCNC: 3.8 MMOL/L — SIGNIFICANT CHANGE UP (ref 3.5–5.3)
POTASSIUM SERPL-SCNC: 3.8 MMOL/L — SIGNIFICANT CHANGE UP (ref 3.5–5.3)
PROT SERPL-MCNC: 6.1 G/DL — SIGNIFICANT CHANGE UP (ref 6–8.3)
PROTHROM AB SERPL-ACNC: 11.9 SEC — SIGNIFICANT CHANGE UP (ref 10.5–13.4)
RBC # BLD: 3.2 M/UL — LOW (ref 4.2–5.8)
RBC # FLD: 14.1 % — SIGNIFICANT CHANGE UP (ref 10.3–14.5)
SODIUM SERPL-SCNC: 142 MMOL/L — SIGNIFICANT CHANGE UP (ref 135–145)
TROPONIN T, HIGH SENSITIVITY RESULT: 9 NG/L — SIGNIFICANT CHANGE UP (ref 0–51)
WBC # BLD: 10.37 K/UL — SIGNIFICANT CHANGE UP (ref 3.8–10.5)
WBC # FLD AUTO: 10.37 K/UL — SIGNIFICANT CHANGE UP (ref 3.8–10.5)

## 2022-07-15 PROCEDURE — 99223 1ST HOSP IP/OBS HIGH 75: CPT

## 2022-07-15 PROCEDURE — 93010 ELECTROCARDIOGRAM REPORT: CPT

## 2022-07-15 RX ORDER — LACTULOSE 10 G/15ML
10 SOLUTION ORAL ONCE
Refills: 0 | Status: COMPLETED | OUTPATIENT
Start: 2022-07-15 | End: 2022-07-15

## 2022-07-15 RX ORDER — AMLODIPINE BESYLATE 2.5 MG/1
10 TABLET ORAL DAILY
Refills: 0 | Status: DISCONTINUED | OUTPATIENT
Start: 2022-07-15 | End: 2022-07-27

## 2022-07-15 RX ORDER — POLYETHYLENE GLYCOL 3350 17 G/17G
17 POWDER, FOR SOLUTION ORAL
Refills: 0 | Status: DISCONTINUED | OUTPATIENT
Start: 2022-07-15 | End: 2022-07-27

## 2022-07-15 RX ORDER — SODIUM,POTASSIUM PHOSPHATES 278-250MG
2 POWDER IN PACKET (EA) ORAL ONCE
Refills: 0 | Status: COMPLETED | OUTPATIENT
Start: 2022-07-15 | End: 2022-07-15

## 2022-07-15 RX ADMIN — OXYCODONE HYDROCHLORIDE 5 MILLIGRAM(S): 5 TABLET ORAL at 15:05

## 2022-07-15 RX ADMIN — OXYCODONE HYDROCHLORIDE 5 MILLIGRAM(S): 5 TABLET ORAL at 19:50

## 2022-07-15 RX ADMIN — AMPICILLIN SODIUM AND SULBACTAM SODIUM 100 GRAM(S): 250; 125 INJECTION, POWDER, FOR SUSPENSION INTRAMUSCULAR; INTRAVENOUS at 05:32

## 2022-07-15 RX ADMIN — Medication 25 MILLIGRAM(S): at 06:16

## 2022-07-15 RX ADMIN — OXYCODONE HYDROCHLORIDE 5 MILLIGRAM(S): 5 TABLET ORAL at 19:20

## 2022-07-15 RX ADMIN — OXYCODONE HYDROCHLORIDE 5 MILLIGRAM(S): 5 TABLET ORAL at 15:50

## 2022-07-15 RX ADMIN — HYDROMORPHONE HYDROCHLORIDE 0.5 MILLIGRAM(S): 2 INJECTION INTRAMUSCULAR; INTRAVENOUS; SUBCUTANEOUS at 11:30

## 2022-07-15 RX ADMIN — OXYCODONE HYDROCHLORIDE 5 MILLIGRAM(S): 5 TABLET ORAL at 08:18

## 2022-07-15 RX ADMIN — LACTULOSE 10 GRAM(S): 10 SOLUTION ORAL at 13:07

## 2022-07-15 RX ADMIN — AMPICILLIN SODIUM AND SULBACTAM SODIUM 100 GRAM(S): 250; 125 INJECTION, POWDER, FOR SUSPENSION INTRAMUSCULAR; INTRAVENOUS at 18:36

## 2022-07-15 RX ADMIN — OXYCODONE HYDROCHLORIDE 5 MILLIGRAM(S): 5 TABLET ORAL at 02:38

## 2022-07-15 RX ADMIN — Medication 2 PACKET(S): at 13:07

## 2022-07-15 RX ADMIN — OXYCODONE HYDROCHLORIDE 5 MILLIGRAM(S): 5 TABLET ORAL at 03:08

## 2022-07-15 RX ADMIN — OXYCODONE HYDROCHLORIDE 5 MILLIGRAM(S): 5 TABLET ORAL at 09:00

## 2022-07-15 RX ADMIN — Medication 650 MILLIGRAM(S): at 03:08

## 2022-07-15 RX ADMIN — CHLORHEXIDINE GLUCONATE 1 APPLICATION(S): 213 SOLUTION TOPICAL at 13:08

## 2022-07-15 RX ADMIN — HYDROMORPHONE HYDROCHLORIDE 0.5 MILLIGRAM(S): 2 INJECTION INTRAMUSCULAR; INTRAVENOUS; SUBCUTANEOUS at 11:02

## 2022-07-15 RX ADMIN — ENOXAPARIN SODIUM 40 MILLIGRAM(S): 100 INJECTION SUBCUTANEOUS at 11:03

## 2022-07-15 RX ADMIN — Medication 650 MILLIGRAM(S): at 02:38

## 2022-07-15 RX ADMIN — AMPICILLIN SODIUM AND SULBACTAM SODIUM 100 GRAM(S): 250; 125 INJECTION, POWDER, FOR SUSPENSION INTRAMUSCULAR; INTRAVENOUS at 13:06

## 2022-07-15 NOTE — CONSULT NOTE ADULT - ASSESSMENT
The patient arrived to the SICU from the OSH while intubated. Patient underwent right forearm wound exploration and washout on 7/13/22 with noted strong radial pulse only noted and ulnar artery primarily repaired from OSH with end-end anastomosis but no flow palpated. Muscle approximated over major blood vessels.   Post-operatively patient has loss of sensation and motor function in the ulnar nerve distribution. dopplerable palmar arch signals noted and palpable radial and ulnar flow     **INCOMPLETE PLAN**  Plan:  f/u final report of CTA RUE  plan to be discussed with plastic surgery regarding optimal surgical managment     Plan discussed with fellow on behalf of attending on call, Dr. Jen Lucas MD PGY-2  Vascular Surgery   [id number]   71y Male presents as transfer from [facility] for traumatic injury of RUE, s/p ulnar interposition graft, CTA showing no ulnar flow but with palpable radial pulse and palmar arch signals     - No acute surgical intervention indicated at this time, right hand with adequate perfusion via radial artery  - Recommend ASA81 and RUE precautions  - Discussed with fellow on behalf of attending on call, Dr. Jen Lucas MD PGY-2  Vascular Surgery   [id number]   71y Male presents as transfer from [facility] for traumatic injury of RUE, s/p ulnar interposition graft, CTA showing no ulnar flow but with palpable radial pulse and palmar arch signals     - No acute surgical intervention indicated at this time, right hand with adequate perfusion via radial artery  - Recommend ASA81 and RUE precautions  - Discussed with fellow on behalf of attending on call, Dr. Jen Lucas MD PGY-2  Vascular Surgery   [phone]      ATTENDING STATEMENT :   Full consult note as above; discussed with surgery resident and vascular fellow.   He sustained trauma to his RT hand and had an ulnar artery bypass. There was a high bifurcation of the brachial artery and there is a patent radial artery all the way down to the hand. The ulnar bypass appears to be occluded. His hand is warm with good capillary refill and there is no indication for any arm ischemia. He also had a fasciotomy. He should be followed by plastics/ hand surgery.

## 2022-07-15 NOTE — CONSULT NOTE ADULT - SUBJECTIVE AND OBJECTIVE BOX
TRAUMA COMANAGEMENT MEDICINE ATTENDING INITIAL CONSULT NOTE    Jennifer Sena MD  Division of Hospital Medicine  Available via MS teams  Pager [phone]  If no response or off hours, page [phone]    HPI:  71y Male presents as transfer from [facility] s/p unclear procedure after a fall. According to the patient's family who were at bedside to give collateral, on 7/12 the patient fell from a ladder and his R arm landed on rebar. The fall was witnessed and the patient reportedly did not hit his head or experience LOC. According to his family the patient underwent a bypass procedure for one of the arteries in his arm, and according to forms that came alongside the patient from [facility], he had been consented for a wound exploration with possible angio. Prior to his procedure, the patient did not endorse any loss of sensation or motor function in his right extremity. The patient arrived to the SICU from the OSH while intubated.    (13 Jul 2022 15:12)    SUBJECTIVE:   overnight W/ chest pain, now resolved.  seen w/ son at bedside  denies n/v/f/chills, Denies current cp, sob, abd pain  r arm pain controlled    PAST MEDICAL & SURGICAL HISTORY:  HTN (hypertension)      Knee pain, left      Squamous cell carcinoma  scalp      Parotid mass  left - benign      H/O squamous cell carcinoma  removed form the scalp in 2016          Review of Systems:   CONSTITUTIONAL: No fever, weight loss, or fatigue  EYES: No eye pain, visual disturbances, or discharge  ENMT:  No difficulty hearing, tinnitus, vertigo; No sinus or throat pain  NECK: No pain or stiffness  RESPIRATORY: No cough, wheezing, chills or hemoptysis; No shortness of breath  CARDIOVASCULAR: No chest pain, palpitations, dizziness, or leg swelling  GASTROINTESTINAL: No abdominal or epigastric pain. No nausea, vomiting, or hematemesis; No diarrhea or constipation. No melena or hematochezia.  GENITOURINARY: No dysuria, frequency, hematuria, or incontinence  NEUROLOGICAL: No headaches, memory loss, loss of strength, numbness, or tremors  SKIN: No itching, burning, rashes, or lesions   ENDOCRINE: No heat or cold intolerance; No hair loss  MUSCULOSKELETAL: R ARM pain  PSYCHIATRIC: No depression, anxiety, mood swings, or difficulty sleeping  HEME/LYMPH: No easy bruising, or bleeding gums  ALLERY AND IMMUNOLOGIC: No hives or eczema    Allergies    No Known Allergies    Intolerances    Social History:   Denies smoking, etoh  FAMILY HISTORY:  Family history of diabetes mellitus (Mother)     Noncontributory    CAPILLARY BLOOD GLUCOSE        Vital Signs Last 24 Hrs  T(C): 36.9 (15 Jul 2022 10:17), Max: 37.5 (15 Jul 2022 06:21)  T(F): 98.4 (15 Jul 2022 10:17), Max: 99.5 (15 Jul 2022 06:21)  HR: 71 (15 Jul 2022 10:17) (66 - 98)  BP: 130/69 (15 Jul 2022 10:17) (107/63 - 168/89)  BP(mean): 97 (15 Jul 2022 00:00) (80 - 119)  RR: 18 (15 Jul 2022 10:17) (16 - 26)  SpO2: 93% (15 Jul 2022 10:17) (90% - 95%)    Parameters below as of 15 Jul 2022 10:17  Patient On (Oxygen Delivery Method): room air    CONSTITUTIONAL: Well-developed, well-groomed, in no apparent distress  EYES: No conjunctival or scleral injection, non-icteric; PERRLA and symmetric  ENMT: No external nasal lesions; nasal mucosa not inflamed; normal dentition; no pharyngeal injection or exudates, oral mucosa with moist membranes  RESPIRATORY: Breathing comfortably; lungs CTA without wheeze/rhonchi/rales  CARDIOVASCULAR: +S1S2, RRR, no M/G/R; pedal pulses full and symmetric; no lower extremity edema  GASTROINTESTINAL: No palpable masses or tenderness, +BS throughout, no rebound/guarding; no hernia palpated  MUSCULOSKELETAL: R arm dressed in ace bandaging w/ decr flexion/extension of 4th-5th digits  SKIN: No rashes or ulcers noted; no subcutaneous nodules or induration palpable  NEUROLOGIC:  sensation intact in LEs b/l to light touch  PSYCHIATRIC: A+O x 3; mood and affect appropriate; appropriate insight and judgment    LABS:                        9.5    10.37 )-----------( 276      ( 15 Jul 2022 03:31 )             29.5     07-15    142  |  107  |  20  ----------------------------<  109<H>  3.8   |  26  |  1.08    Ca    8.2<L>      15 Jul 2022 03:31  Phos  1.6     07-15  Mg     2.2     07-15    TPro  6.1  /  Alb  3.6  /  TBili  0.4  /  DBili  x   /  AST  21  /  ALT  16  /  AlkPhos  53  07-15    PT/INR - ( 15 Jul 2022 07:43 )   PT: 11.9 sec;   INR: 1.03 ratio         PTT - ( 15 Jul 2022 07:43 )  PTT:26.7 sec  CARDIAC MARKERS ( 15 Jul 2022 03:31 )  x     / x     / x     / x     / 2.6 ng/mL  CARDIAC MARKERS ( 13 Jul 2022 18:02 )  x     / x     / 421 U/L / x     / x          MEDICATIONS  (STANDING):  ampicillin/sulbactam  IVPB 1.5 Gram(s) IV Intermittent every 6 hours  chlorhexidine 2% Cloths 1 Application(s) Topical <User Schedule>  enoxaparin Injectable 40 milliGRAM(s) SubCutaneous every 24 hours  metoprolol tartrate 25 milliGRAM(s) Oral every 12 hours  polyethylene glycol 3350 17 Gram(s) Oral two times a day  senna 2 Tablet(s) Oral at bedtime    MEDICATIONS  (PRN):  acetaminophen     Tablet .. 650 milliGRAM(s) Oral every 6 hours PRN Mild Pain (1 - 3)  HYDROmorphone  Injectable 0.5 milliGRAM(s) IV Push every 3 hours PRN breakthrough pain  oxyCODONE    IR 5 milliGRAM(s) Oral every 4 hours PRN Moderate to Severe Pain (4 - 10)      Home Medications:  amLODIPine 10 mg oral tablet: 1 tab(s) orally once a day (13 Jul 2022 12:40)  valsartan-hydrochlorothiazide 320 mg-25 mg oral tablet: 1 tab(s) orally once a day (13 Jul 2022 12:40)

## 2022-07-15 NOTE — PROGRESS NOTE ADULT - ASSESSMENT
71 y/o M presented as transfer from [facility] s/p R arm wound exploration, debridement, right brachial artery cutdown, angiogram w/ concern for a ulnar artery dissection, interposition bypass w/ cephalic vein graft, and wound VAC placement @OSH on 7/12 s/p degloving volar injury after a 7 foot fall off a ladder. Transferred to [facility] for further management. Taken to the OR by Trauma team for R forearm wound exploration and washout.     Plan:  - Appreciate excellent care and management of the SICU  - Appreciate trauma recs for dressing change, discuss need for potential vascular consult  - Consult OT for R wrist splint in neutral position w/ MCP/fingers intrinsic positive positioning  - Discuss case w/ Dr. Narvaez

## 2022-07-15 NOTE — CONSULT NOTE ADULT - PROBLEM SELECTOR RECOMMENDATION 9
s/p fasciotomy and wound washout  - as per surgery and plastics  - vascular consulted  - Pain control - tylenol prn mild, oxy prn severe pain  - bowel regimen - incr to miralax BID, c/w senna, added lactulose x 1 given constipation  - IV unasyn

## 2022-07-15 NOTE — CONSULT NOTE ADULT - ASSESSMENT
70 yo M PMH HTN presented as a fall from ladder adm w/ r arm traumatic wound s/p fasciotomy and wound washout, now transferred out of SICU.

## 2022-07-15 NOTE — CONSULT NOTE ADULT - PROBLEM SELECTOR RECOMMENDATION 3
overnight, left sided, resolved now  troponin wnl, EKG personally reviewed NSR, no ST elevations or depressions, QTC wnl  - likely atypical chest pain, no further workup needed at this time unless recurrent

## 2022-07-15 NOTE — CONSULT NOTE ADULT - SUBJECTIVE AND OBJECTIVE BOX
Vascular Surgery Consult  Consulting surgical team: Vascular Surgery  Consulting attending: Dr. Li    HPI:  71y Male presents as transfer from [facility] s/p unclear procedure after a fall. According to the patient's family who were at bedside to give collateral, on 7/12 the patient fell from a ladder and his R arm landed on rebar. The fall was witnessed and the patient reportedly did not hit his head or experience LOC. According to his family the patient underwent a bypass procedure for one of the arteries in his arm, and according to forms that came alongside the patient from [facility], he had been consented for a wound exploration with possible angio. Prior to his procedure, the patient did not endorse any loss of sensation or motor function in his right extremity. The patient arrived to the SICU from the OSH while intubated. Patient underwent right forearm wound exploration and washout on 7/13/22 with noted strong radial pulse only noted and ulnar artery primarily repaired from OSH with end-end anastomosis but no flow palpated. Muscle approximated over major blood vessels.   Post-operatively patient has loss of sensation and motor function in the ulnar nerve distribution. dopplerable palmar arch signals noted and palpable radial and ulnar flow    Vascular surgery consulted for further evaluation of anastomosis.   CTA RUE prelim report: No evidence for active extravasation.  Occlusion of the proximal ulnar artery with distal reconstitution near   the wrist. Large soft tissue wound with subcutaneous emphysema in the anteromedial   aspect of the right forearm.      PAST MEDICAL HISTORY:  HTN (hypertension)    Knee pain, left    Basal cell carcinoma    Squamous cell carcinoma    Parotid mass        PAST SURGICAL HISTORY:  No significant past surgical history    Basal cell carcinoma    H/O squamous cell carcinoma        MEDICATIONS:  acetaminophen     Tablet .. 650 milliGRAM(s) Oral every 6 hours PRN  ampicillin/sulbactam  IVPB 1.5 Gram(s) IV Intermittent every 6 hours  chlorhexidine 2% Cloths 1 Application(s) Topical <User Schedule>  enoxaparin Injectable 40 milliGRAM(s) SubCutaneous every 24 hours  HYDROmorphone  Injectable 0.5 milliGRAM(s) IV Push every 3 hours PRN  metoprolol tartrate 25 milliGRAM(s) Oral every 12 hours  oxyCODONE    IR 5 milliGRAM(s) Oral every 4 hours PRN  polyethylene glycol 3350 17 Gram(s) Oral daily  potassium phosphate / sodium phosphate Powder (PHOS-NaK) 2 Packet(s) Oral once  senna 2 Tablet(s) Oral at bedtime      ALLERGIES:  No Known Allergies      VITALS & I/Os:  Vital Signs Last 24 Hrs  T(C): 36.9 (15 Jul 2022 10:17), Max: 37.5 (14 Jul 2022 15:00)  T(F): 98.4 (15 Jul 2022 10:17), Max: 99.5 (14 Jul 2022 15:00)  HR: 71 (15 Jul 2022 10:17) (66 - 98)  BP: 130/69 (15 Jul 2022 10:17) (107/63 - 170/77)  BP(mean): 97 (15 Jul 2022 00:00) (80 - 119)  RR: 18 (15 Jul 2022 10:17) (16 - 26)  SpO2: 93% (15 Jul 2022 10:17) (90% - 95%)    Parameters below as of 15 Jul 2022 10:17  Patient On (Oxygen Delivery Method): room air        I&O's Summary    14 Jul 2022 07:01  -  15 Jul 2022 07:00  --------------------------------------------------------  IN: 1130 mL / OUT: 1550 mL / NET: -420 mL        PHYSICAL EXAM:  General: No acute distress  Respiratory: Nonlabored  Cardiovascular: RRR  Abdominal: Soft, nondistended, nontender. No rebound or guarding. No organomegaly, no palpable mass.  Extremities: Warm    LABS:                        9.5    10.37 )-----------( 276      ( 15 Jul 2022 03:31 )             29.5     07-15    142  |  107  |  20  ----------------------------<  109<H>  3.8   |  26  |  1.08    Ca    8.2<L>      15 Jul 2022 03:31  Phos  1.6     07-15  Mg     2.2     07-15    TPro  6.1  /  Alb  3.6  /  TBili  0.4  /  DBili  x   /  AST  21  /  ALT  16  /  AlkPhos  53  07-15    Lactate:    PT/INR - ( 15 Jul 2022 07:43 )   PT: 11.9 sec;   INR: 1.03 ratio         PTT - ( 15 Jul 2022 07:43 )  PTT:26.7 sec  ABG - ( 13 Jul 2022 17:50 )  pH, Arterial: 7.34  pH, Blood: x     /  pCO2: 39    /  pO2: 91    / HCO3: 21    / Base Excess: -4.4  /  SaO2: 98.0              CARDIAC MARKERS ( 15 Jul 2022 03:31 )  x     / x     / x     / x     / 2.6 ng/mL  CARDIAC MARKERS ( 13 Jul 2022 18:02 )  x     / x     / 421 U/L / x     / x      CARDIAC MARKERS ( 13 Jul 2022 14:06 )  x     / x     / 393 U/L / x     / x                IMAGING:                                                                                               Vascular Surgery Consult  Consulting surgical team: Vascular Surgery  Consulting attending: Dr. Li    HPI:  71y Male presents as transfer from [facility] s/p unclear procedure after a fall. According to the patient's family who were at bedside to give collateral, on 7/12 the patient fell from a ladder and his R arm landed on rebar. The fall was witnessed and the patient reportedly did not hit his head or experience LOC. According to his family the patient underwent a bypass procedure for one of the arteries in his arm, and according to forms that came alongside the patient from [facility], he had been consented for a wound exploration with possible angio. Prior to his procedure, the patient did not endorse any loss of sensation or motor function in his right extremity. The patient arrived to the SICU from the OSH while intubated. Patient underwent right forearm wound exploration and washout on 7/13/22 with noted strong radial pulse only noted and ulnar artery primarily repaired from OSH with end-end anastomosis but no flow palpated. Muscle approximated over major blood vessels.   Post-operatively patient has loss of sensation and motor function in the ulnar nerve distribution. dopplerable palmar arch signals noted and palpable radial and ulnar flow    Vascular surgery consulted for further evaluation of anastomosis.   CTA RUE prelim report: No evidence for active extravasation.  Occlusion of the proximal ulnar artery with distal reconstitution near   the wrist. Large soft tissue wound with subcutaneous emphysema in the anteromedial   aspect of the right forearm.      PAST MEDICAL HISTORY:  HTN (hypertension)    Knee pain, left    Basal cell carcinoma    Squamous cell carcinoma    Parotid mass        PAST SURGICAL HISTORY:  No significant past surgical history    Basal cell carcinoma    H/O squamous cell carcinoma        MEDICATIONS:  acetaminophen     Tablet .. 650 milliGRAM(s) Oral every 6 hours PRN  ampicillin/sulbactam  IVPB 1.5 Gram(s) IV Intermittent every 6 hours  chlorhexidine 2% Cloths 1 Application(s) Topical <User Schedule>  enoxaparin Injectable 40 milliGRAM(s) SubCutaneous every 24 hours  HYDROmorphone  Injectable 0.5 milliGRAM(s) IV Push every 3 hours PRN  metoprolol tartrate 25 milliGRAM(s) Oral every 12 hours  oxyCODONE    IR 5 milliGRAM(s) Oral every 4 hours PRN  polyethylene glycol 3350 17 Gram(s) Oral daily  potassium phosphate / sodium phosphate Powder (PHOS-NaK) 2 Packet(s) Oral once  senna 2 Tablet(s) Oral at bedtime      ALLERGIES:  No Known Allergies      VITALS & I/Os:  Vital Signs Last 24 Hrs  T(C): 36.9 (15 Jul 2022 10:17), Max: 37.5 (14 Jul 2022 15:00)  T(F): 98.4 (15 Jul 2022 10:17), Max: 99.5 (14 Jul 2022 15:00)  HR: 71 (15 Jul 2022 10:17) (66 - 98)  BP: 130/69 (15 Jul 2022 10:17) (107/63 - 170/77)  BP(mean): 97 (15 Jul 2022 00:00) (80 - 119)  RR: 18 (15 Jul 2022 10:17) (16 - 26)  SpO2: 93% (15 Jul 2022 10:17) (90% - 95%)    Parameters below as of 15 Jul 2022 10:17  Patient On (Oxygen Delivery Method): room air        I&O's Summary    14 Jul 2022 07:01  -  15 Jul 2022 07:00  --------------------------------------------------------  IN: 1130 mL / OUT: 1550 mL / NET: -420 mL        PHYSICAL EXAM:  General: No acute distress  Respiratory: Nonlabored  Cardiovascular: RRR  Abdominal: Soft, nondistended, nontender. No rebound or guarding. No organomegaly, no palpable mass.  Extremities: Warm, RUE wrapped in ACE, palpable radial pulse, dopplerable superficial and deep palmar archs, +motor/sensory deficits of digits 4 & 5     LABS:                        9.5    10.37 )-----------( 276      ( 15 Jul 2022 03:31 )             29.5     07-15    142  |  107  |  20  ----------------------------<  109<H>  3.8   |  26  |  1.08    Ca    8.2<L>      15 Jul 2022 03:31  Phos  1.6     07-15  Mg     2.2     07-15    TPro  6.1  /  Alb  3.6  /  TBili  0.4  /  DBili  x   /  AST  21  /  ALT  16  /  AlkPhos  53  07-15    Lactate:    PT/INR - ( 15 Jul 2022 07:43 )   PT: 11.9 sec;   INR: 1.03 ratio         PTT - ( 15 Jul 2022 07:43 )  PTT:26.7 sec  ABG - ( 13 Jul 2022 17:50 )  pH, Arterial: 7.34  pH, Blood: x     /  pCO2: 39    /  pO2: 91    / HCO3: 21    / Base Excess: -4.4  /  SaO2: 98.0              CARDIAC MARKERS ( 15 Jul 2022 03:31 )  x     / x     / x     / x     / 2.6 ng/mL  CARDIAC MARKERS ( 13 Jul 2022 18:02 )  x     / x     / 421 U/L / x     / x      CARDIAC MARKERS ( 13 Jul 2022 14:06 )  x     / x     / 393 U/L / x     / x                IMAGING:

## 2022-07-15 NOTE — PROGRESS NOTE ADULT - SUBJECTIVE AND OBJECTIVE BOX
Plastic Surgery    SUBJECTIVE: Pt seen and examined on rounds with team. No acute events overnight.        OBJECTIVE    PHYSICAL EXAM:   General: NAD, Lying in bed   Neuro: Awake and alert  Extremities:     R arm: In ACE wrap, no visible strikethrough or bleeding across dressing. Digits WWP, Sensation intact to light touch over radial, median at the fingers and hand. Loss of sensation in ulnar nerve distributions at the fingers and hand. Global active movement of wrist, hand, fingers limited 2/2 to pain. Passively, full ROM.     VITALS  T(C): 36.9 (07-15-22 @ 10:17), Max: 37.5 (07-14-22 @ 15:00)  HR: 71 (07-15-22 @ 10:17) (66 - 98)  BP: 130/69 (07-15-22 @ 10:17) (107/63 - 168/89)  RR: 18 (07-15-22 @ 10:17) (16 - 26)  SpO2: 93% (07-15-22 @ 10:17) (90% - 95%)  CAPILLARY BLOOD GLUCOSE          Is/Os    07-14 @ 07:01  -  07-15 @ 07:00  --------------------------------------------------------  IN:    IV PiggyBack: 150 mL    Oral Fluid: 980 mL  Total IN: 1130 mL    OUT:    Voided (mL): 1550 mL  Total OUT: 1550 mL    Total NET: -420 mL          MEDICATIONS (STANDING): ampicillin/sulbactam  IVPB 1.5 Gram(s) IV Intermittent every 6 hours  enoxaparin Injectable 40 milliGRAM(s) SubCutaneous every 24 hours  metoprolol tartrate 25 milliGRAM(s) Oral every 12 hours  polyethylene glycol 3350 17 Gram(s) Oral daily  potassium phosphate / sodium phosphate Powder (PHOS-NaK) 2 Packet(s) Oral once  senna 2 Tablet(s) Oral at bedtime    MEDICATIONS (PRN):acetaminophen     Tablet .. 650 milliGRAM(s) Oral every 6 hours PRN Mild Pain (1 - 3)  HYDROmorphone  Injectable 0.5 milliGRAM(s) IV Push every 3 hours PRN breakthrough pain  oxyCODONE    IR 5 milliGRAM(s) Oral every 4 hours PRN Moderate to Severe Pain (4 - 10)      LABS  CBC (07-15 @ 03:31)                              9.5<L>                         10.37   )----------------(  276        --    % Neutrophils, --    % Lymphocytes, ANC: --                                  29.5<L>  CBC (07-14 @ 01:20)                              9.4<L>                         14.70<H>  )----------------(  291        --    % Neutrophils, --    % Lymphocytes, ANC: --                                  28.3<L>    BMP (07-15 @ 07:38)             --      |  --      |  --    		Ca++ --      Ca --                 ---------------------------------( --    		Mg 2.2                --      |  --      |  --    			Ph 1.6<L>  BMP (07-15 @ 03:31)             142     |  107     |  20    		Ca++ --      Ca 8.2<L>             ---------------------------------( 109<H>		Mg --                 3.8     |  26      |  1.08  			Ph --        LFTs (07-15 @ 03:31)      TPro 6.1 / Alb 3.6 / TBili 0.4 / DBili -- / AST 21 / ALT 16 / AlkPhos 53  LFTs (07-14 @ 01:19)      TPro 5.9<L> / Alb 3.3 / TBili 0.4 / DBili -- / AST 17 / ALT 11 / AlkPhos 42    Coags (07-15 @ 07:43)  aPTT 26.7<L> / INR 1.03 / PT 11.9  Coags (07-14 @ 01:19)  aPTT 27.2<L> / INR 1.06 / PT 12.3        VBG (07-14 @ 00:45)     7.36 / 43 / 49<H> / 24 / -1.2 / 83.3%     Lactate: 2.1<H>      IMAGING STUDIES

## 2022-07-15 NOTE — PROGRESS NOTE ADULT - ASSESSMENT
A/P: 71M with PMHx of HTN, transferred from OSH after fall with trauma to right forearm, open laceration, s/p  vascular procedure unknown at OSH. Now s/p wound I&D 7/13.    PLAN:  - Plastics consulted for possible flap coverage/reconstruction  - Cont daily wound care; possible adaptic/xeroform gauze; no wound vac  -possible vascular consult    - Activity- OOB with assistance  - Pain medication as needed   - DVT ppx  - incentive spirometry      ACS, Trauma Team  Pager [phone]      A/P: 71M with PMHx of HTN, transferred from OSH after fall with trauma to right forearm, open laceration, s/p  vascular procedure unknown at OSH. Now s/p wound I&D 7/13.    PLAN:  - Plastics consulted for possible flap coverage/reconstruction  - Cont daily wound care; possible adaptic/xeroform gauze; no wound vac  -vascular consult    - Activity- OOB with assistance  - Pain medication as needed   - DVT ppx  - incentive spirometry      ACS, Trauma Team  Pager [phone]

## 2022-07-15 NOTE — PROGRESS NOTE ADULT - SUBJECTIVE AND OBJECTIVE BOX
SURGERY DAILY PROGRESS NOTE:     SUBJECTIVE/ROS: Patient seen and examined at bedside. Pain well controlled.    MEDICATIONS  (STANDING):  ampicillin/sulbactam  IVPB 1.5 Gram(s) IV Intermittent every 6 hours  chlorhexidine 2% Cloths 1 Application(s) Topical <User Schedule>  enoxaparin Injectable 40 milliGRAM(s) SubCutaneous every 24 hours  metoprolol tartrate 25 milliGRAM(s) Oral every 12 hours  polyethylene glycol 3350 17 Gram(s) Oral daily  potassium phosphate / sodium phosphate Powder (PHOS-NaK) 2 Packet(s) Oral once  senna 2 Tablet(s) Oral at bedtime    MEDICATIONS  (PRN):  acetaminophen     Tablet .. 650 milliGRAM(s) Oral every 6 hours PRN Mild Pain (1 - 3)  HYDROmorphone  Injectable 0.5 milliGRAM(s) IV Push every 3 hours PRN breakthrough pain  oxyCODONE    IR 5 milliGRAM(s) Oral every 4 hours PRN Moderate to Severe Pain (4 - 10)      OBJECTIVE:    Vital Signs Last 24 Hrs  T(C): 37.5 (15 Jul 2022 06:21), Max: 37.5 (14 Jul 2022 15:00)  T(F): 99.5 (15 Jul 2022 06:21), Max: 99.5 (14 Jul 2022 15:00)  HR: 72 (15 Jul 2022 06:21) (66 - 98)  BP: 133/63 (15 Jul 2022 06:21) (107/63 - 170/77)  BP(mean): 97 (15 Jul 2022 00:00) (80 - 119)  RR: 18 (15 Jul 2022 06:21) (16 - 26)  SpO2: 93% (15 Jul 2022 06:21) (90% - 96%)    Parameters below as of 15 Jul 2022 06:21  Patient On (Oxygen Delivery Method): room air            I&O's Detail    14 Jul 2022 07:01  -  15 Jul 2022 07:00  --------------------------------------------------------  IN:    IV PiggyBack: 150 mL    Oral Fluid: 980 mL  Total IN: 1130 mL    OUT:    Voided (mL): 1550 mL  Total OUT: 1550 mL    Total NET: -420 mL          Daily     Daily     LABS:                        9.5    10.37 )-----------( 276      ( 15 Jul 2022 03:31 )             29.5     07-15    142  |  107  |  20  ----------------------------<  109<H>  3.8   |  26  |  1.08    Ca    8.2<L>      15 Jul 2022 03:31  Phos  1.6     07-15  Mg     2.2     07-15    TPro  6.1  /  Alb  3.6  /  TBili  0.4  /  DBili  x   /  AST  21  /  ALT  16  /  AlkPhos  53  07-15    PT/INR - ( 15 Jul 2022 07:43 )   PT: 11.9 sec;   INR: 1.03 ratio         PTT - ( 15 Jul 2022 07:43 )  PTT:26.7 sec        General Appearance: Appears well, NAD  Neck: Supple  Chest: Equal expansion bilaterally, equal breath sounds  CV: Pulse regular presently  Abdomen: Soft, nontense, appropriate incisional tenderness, dressings clean and dry and intact  Extremities: RUE with dressing. Strong radial pulse.

## 2022-07-15 NOTE — CHART NOTE - NSCHARTNOTEFT_GEN_A_CORE
Pt. reported right arm pain with new onset pain radiating to chest. Patient otherwise feeling fine, with no other complaints or changes. Vitals stable within baseline. Stat EKGs, troponin, CBC, CMP, EKG ordered to rule out MI.

## 2022-07-15 NOTE — CONSULT NOTE ADULT - PROBLEM SELECTOR RECOMMENDATION 2
controlled  - not on metoprolol at home - this was dc'ed  - resume home norvasc 10 mg daily  - if bp elevated SBP>140, can resume home valsartan 320/HCTZ 25 mg daily  - monitor

## 2022-07-16 LAB
ALBUMIN SERPL ELPH-MCNC: 3.2 G/DL — LOW (ref 3.3–5)
ALP SERPL-CCNC: 60 U/L — SIGNIFICANT CHANGE UP (ref 40–120)
ALT FLD-CCNC: 16 U/L — SIGNIFICANT CHANGE UP (ref 10–45)
ANION GAP SERPL CALC-SCNC: 14 MMOL/L — SIGNIFICANT CHANGE UP (ref 5–17)
APTT BLD: 26.9 SEC — LOW (ref 27.5–35.5)
AST SERPL-CCNC: 19 U/L — SIGNIFICANT CHANGE UP (ref 10–40)
BILIRUB SERPL-MCNC: 0.7 MG/DL — SIGNIFICANT CHANGE UP (ref 0.2–1.2)
BUN SERPL-MCNC: 18 MG/DL — SIGNIFICANT CHANGE UP (ref 7–23)
CALCIUM SERPL-MCNC: 7.8 MG/DL — LOW (ref 8.4–10.5)
CHLORIDE SERPL-SCNC: 105 MMOL/L — SIGNIFICANT CHANGE UP (ref 96–108)
CO2 SERPL-SCNC: 27 MMOL/L — SIGNIFICANT CHANGE UP (ref 22–31)
CREAT SERPL-MCNC: 0.94 MG/DL — SIGNIFICANT CHANGE UP (ref 0.5–1.3)
EGFR: 88 ML/MIN/1.73M2 — SIGNIFICANT CHANGE UP
GLUCOSE SERPL-MCNC: 103 MG/DL — HIGH (ref 70–99)
HCT VFR BLD CALC: 28.1 % — LOW (ref 39–50)
HGB BLD-MCNC: 9 G/DL — LOW (ref 13–17)
INR BLD: 1.09 RATIO — SIGNIFICANT CHANGE UP (ref 0.88–1.16)
MAGNESIUM SERPL-MCNC: 1.9 MG/DL — SIGNIFICANT CHANGE UP (ref 1.6–2.6)
MCHC RBC-ENTMCNC: 30.4 PG — SIGNIFICANT CHANGE UP (ref 27–34)
MCHC RBC-ENTMCNC: 32 GM/DL — SIGNIFICANT CHANGE UP (ref 32–36)
MCV RBC AUTO: 94.9 FL — SIGNIFICANT CHANGE UP (ref 80–100)
NRBC # BLD: 0 /100 WBCS — SIGNIFICANT CHANGE UP (ref 0–0)
PHOSPHATE SERPL-MCNC: 2.1 MG/DL — LOW (ref 2.5–4.5)
PLATELET # BLD AUTO: 287 K/UL — SIGNIFICANT CHANGE UP (ref 150–400)
POTASSIUM SERPL-MCNC: 3.8 MMOL/L — SIGNIFICANT CHANGE UP (ref 3.5–5.3)
POTASSIUM SERPL-SCNC: 3.8 MMOL/L — SIGNIFICANT CHANGE UP (ref 3.5–5.3)
PROT SERPL-MCNC: 5.8 G/DL — LOW (ref 6–8.3)
PROTHROM AB SERPL-ACNC: 12.5 SEC — SIGNIFICANT CHANGE UP (ref 10.5–13.4)
RBC # BLD: 2.96 M/UL — LOW (ref 4.2–5.8)
RBC # FLD: 13.5 % — SIGNIFICANT CHANGE UP (ref 10.3–14.5)
SODIUM SERPL-SCNC: 146 MMOL/L — HIGH (ref 135–145)
WBC # BLD: 7.5 K/UL — SIGNIFICANT CHANGE UP (ref 3.8–10.5)
WBC # FLD AUTO: 7.5 K/UL — SIGNIFICANT CHANGE UP (ref 3.8–10.5)

## 2022-07-16 RX ORDER — SODIUM,POTASSIUM PHOSPHATES 278-250MG
2 POWDER IN PACKET (EA) ORAL ONCE
Refills: 0 | Status: COMPLETED | OUTPATIENT
Start: 2022-07-16 | End: 2022-07-16

## 2022-07-16 RX ADMIN — CHLORHEXIDINE GLUCONATE 1 APPLICATION(S): 213 SOLUTION TOPICAL at 05:39

## 2022-07-16 RX ADMIN — AMLODIPINE BESYLATE 10 MILLIGRAM(S): 2.5 TABLET ORAL at 05:39

## 2022-07-16 RX ADMIN — AMPICILLIN SODIUM AND SULBACTAM SODIUM 100 GRAM(S): 250; 125 INJECTION, POWDER, FOR SUSPENSION INTRAMUSCULAR; INTRAVENOUS at 23:45

## 2022-07-16 RX ADMIN — HYDROMORPHONE HYDROCHLORIDE 0.5 MILLIGRAM(S): 2 INJECTION INTRAMUSCULAR; INTRAVENOUS; SUBCUTANEOUS at 15:44

## 2022-07-16 RX ADMIN — Medication 2 PACKET(S): at 19:00

## 2022-07-16 RX ADMIN — POLYETHYLENE GLYCOL 3350 17 GRAM(S): 17 POWDER, FOR SOLUTION ORAL at 19:00

## 2022-07-16 RX ADMIN — OXYCODONE HYDROCHLORIDE 5 MILLIGRAM(S): 5 TABLET ORAL at 01:43

## 2022-07-16 RX ADMIN — HYDROMORPHONE HYDROCHLORIDE 0.5 MILLIGRAM(S): 2 INJECTION INTRAMUSCULAR; INTRAVENOUS; SUBCUTANEOUS at 16:30

## 2022-07-16 RX ADMIN — OXYCODONE HYDROCHLORIDE 5 MILLIGRAM(S): 5 TABLET ORAL at 22:36

## 2022-07-16 RX ADMIN — AMPICILLIN SODIUM AND SULBACTAM SODIUM 100 GRAM(S): 250; 125 INJECTION, POWDER, FOR SUSPENSION INTRAMUSCULAR; INTRAVENOUS at 05:39

## 2022-07-16 RX ADMIN — OXYCODONE HYDROCHLORIDE 5 MILLIGRAM(S): 5 TABLET ORAL at 02:43

## 2022-07-16 RX ADMIN — AMPICILLIN SODIUM AND SULBACTAM SODIUM 100 GRAM(S): 250; 125 INJECTION, POWDER, FOR SUSPENSION INTRAMUSCULAR; INTRAVENOUS at 11:31

## 2022-07-16 RX ADMIN — AMPICILLIN SODIUM AND SULBACTAM SODIUM 100 GRAM(S): 250; 125 INJECTION, POWDER, FOR SUSPENSION INTRAMUSCULAR; INTRAVENOUS at 19:01

## 2022-07-16 RX ADMIN — ENOXAPARIN SODIUM 40 MILLIGRAM(S): 100 INJECTION SUBCUTANEOUS at 11:31

## 2022-07-16 RX ADMIN — AMPICILLIN SODIUM AND SULBACTAM SODIUM 100 GRAM(S): 250; 125 INJECTION, POWDER, FOR SUSPENSION INTRAMUSCULAR; INTRAVENOUS at 00:07

## 2022-07-16 RX ADMIN — OXYCODONE HYDROCHLORIDE 5 MILLIGRAM(S): 5 TABLET ORAL at 12:10

## 2022-07-16 RX ADMIN — OXYCODONE HYDROCHLORIDE 5 MILLIGRAM(S): 5 TABLET ORAL at 23:36

## 2022-07-16 RX ADMIN — OXYCODONE HYDROCHLORIDE 5 MILLIGRAM(S): 5 TABLET ORAL at 11:30

## 2022-07-16 NOTE — PROGRESS NOTE ADULT - ASSESSMENT
A/P: 71M with PMHx of HTN, transferred from OSH after fall with trauma to right forearm, open laceration, s/p  vascular procedure unknown at OSH. Now s/p wound I&D 7/13.    PLAN:  - Plastics consulted for possible flap coverage/reconstruction  - Cont daily wound care; possible adaptic/xeroform gauze; no wound vac  -vascular consult    - Activity- OOB with assistance  - Pain medication as needed   - DVT ppx  - incentive spirometry      ACS, Trauma Team  Pager [phone]

## 2022-07-16 NOTE — PROGRESS NOTE ADULT - SUBJECTIVE AND OBJECTIVE BOX
SURGERY DAILY PROGRESS NOTE:     SUBJECTIVE/ROS: Patient seen and examined at bedside. Pain well controlled.    MEDICATIONS  (STANDING):  amLODIPine   Tablet 10 milliGRAM(s) Oral daily  ampicillin/sulbactam  IVPB 1.5 Gram(s) IV Intermittent every 6 hours  chlorhexidine 2% Cloths 1 Application(s) Topical <User Schedule>  enoxaparin Injectable 40 milliGRAM(s) SubCutaneous every 24 hours  polyethylene glycol 3350 17 Gram(s) Oral two times a day  senna 2 Tablet(s) Oral at bedtime    MEDICATIONS  (PRN):  acetaminophen     Tablet .. 650 milliGRAM(s) Oral every 6 hours PRN Mild Pain (1 - 3)  HYDROmorphone  Injectable 0.5 milliGRAM(s) IV Push every 3 hours PRN breakthrough pain  oxyCODONE    IR 5 milliGRAM(s) Oral every 4 hours PRN Moderate to Severe Pain (4 - 10)    OBJECTIVE:    I&O's Detail    15 Jul 2022 07:01  -  16 Jul 2022 07:00  --------------------------------------------------------  IN:    IV PiggyBack: 200 mL    Oral Fluid: 1600 mL  Total IN: 1800 mL    OUT:  Total OUT: 0 mL    Total NET: 1800 mL      16 Jul 2022 07:01  -  16 Jul 2022 14:42  --------------------------------------------------------  IN:    IV PiggyBack: 50 mL    Oral Fluid: 180 mL  Total IN: 230 mL    OUT:  Total OUT: 0 mL    Total NET: 230 mL    General Appearance: Appears well, NAD                        9.0    7.50  )-----------( 287      ( 16 Jul 2022 07:02 )             28.1       07-16    146<H>  |  105  |  18  ----------------------------<  103<H>  3.8   |  27  |  0.94    Ca    7.8<L>      16 Jul 2022 07:02  Phos  2.1     07-16  Mg     1.9     07-16    TPro  5.8<L>  /  Alb  3.2<L>  /  TBili  0.7  /  DBili  x   /  AST  19  /  ALT  16  /  AlkPhos  60  07-16      PT/INR - ( 16 Jul 2022 07:02 )   PT: 12.5 sec;   INR: 1.09 ratio         PTT - ( 16 Jul 2022 07:02 )  PTT:26.9 sec  CARDIAC MARKERS ( 15 Jul 2022 03:31 )  x     / x     / x     / x     / 2.6 ng/mL    Neck: Supple  Chest: Equal expansion bilaterally, equal breath sounds  CV: Pulse regular presently  Abdomen: Soft, nontense, appropriate incisional tenderness, dressings clean and dry and intact  Extremities: RUE with dressing. Strong radial pulse.

## 2022-07-17 LAB
ALBUMIN SERPL ELPH-MCNC: 3.3 G/DL — SIGNIFICANT CHANGE UP (ref 3.3–5)
ALP SERPL-CCNC: 84 U/L — SIGNIFICANT CHANGE UP (ref 40–120)
ALT FLD-CCNC: 28 U/L — SIGNIFICANT CHANGE UP (ref 10–45)
ANION GAP SERPL CALC-SCNC: 8 MMOL/L — SIGNIFICANT CHANGE UP (ref 5–17)
AST SERPL-CCNC: 27 U/L — SIGNIFICANT CHANGE UP (ref 10–40)
BILIRUB SERPL-MCNC: 0.7 MG/DL — SIGNIFICANT CHANGE UP (ref 0.2–1.2)
BUN SERPL-MCNC: 18 MG/DL — SIGNIFICANT CHANGE UP (ref 7–23)
CALCIUM SERPL-MCNC: 8.7 MG/DL — SIGNIFICANT CHANGE UP (ref 8.4–10.5)
CHLORIDE SERPL-SCNC: 102 MMOL/L — SIGNIFICANT CHANGE UP (ref 96–108)
CO2 SERPL-SCNC: 27 MMOL/L — SIGNIFICANT CHANGE UP (ref 22–31)
CREAT SERPL-MCNC: 1.01 MG/DL — SIGNIFICANT CHANGE UP (ref 0.5–1.3)
EGFR: 81 ML/MIN/1.73M2 — SIGNIFICANT CHANGE UP
GLUCOSE SERPL-MCNC: 110 MG/DL — HIGH (ref 70–99)
HCT VFR BLD CALC: 29.9 % — LOW (ref 39–50)
HGB BLD-MCNC: 9.7 G/DL — LOW (ref 13–17)
MAGNESIUM SERPL-MCNC: 2 MG/DL — SIGNIFICANT CHANGE UP (ref 1.6–2.6)
MCHC RBC-ENTMCNC: 30.1 PG — SIGNIFICANT CHANGE UP (ref 27–34)
MCHC RBC-ENTMCNC: 32.4 GM/DL — SIGNIFICANT CHANGE UP (ref 32–36)
MCV RBC AUTO: 92.9 FL — SIGNIFICANT CHANGE UP (ref 80–100)
NRBC # BLD: 0 /100 WBCS — SIGNIFICANT CHANGE UP (ref 0–0)
PHOSPHATE SERPL-MCNC: 2.5 MG/DL — SIGNIFICANT CHANGE UP (ref 2.5–4.5)
PLATELET # BLD AUTO: 346 K/UL — SIGNIFICANT CHANGE UP (ref 150–400)
POTASSIUM SERPL-MCNC: 4.1 MMOL/L — SIGNIFICANT CHANGE UP (ref 3.5–5.3)
POTASSIUM SERPL-SCNC: 4.1 MMOL/L — SIGNIFICANT CHANGE UP (ref 3.5–5.3)
PROT SERPL-MCNC: 6.2 G/DL — SIGNIFICANT CHANGE UP (ref 6–8.3)
RBC # BLD: 3.22 M/UL — LOW (ref 4.2–5.8)
RBC # FLD: 13.1 % — SIGNIFICANT CHANGE UP (ref 10.3–14.5)
SODIUM SERPL-SCNC: 137 MMOL/L — SIGNIFICANT CHANGE UP (ref 135–145)
WBC # BLD: 8.22 K/UL — SIGNIFICANT CHANGE UP (ref 3.8–10.5)
WBC # FLD AUTO: 8.22 K/UL — SIGNIFICANT CHANGE UP (ref 3.8–10.5)

## 2022-07-17 RX ADMIN — AMLODIPINE BESYLATE 10 MILLIGRAM(S): 2.5 TABLET ORAL at 06:06

## 2022-07-17 RX ADMIN — AMPICILLIN SODIUM AND SULBACTAM SODIUM 100 GRAM(S): 250; 125 INJECTION, POWDER, FOR SUSPENSION INTRAMUSCULAR; INTRAVENOUS at 23:26

## 2022-07-17 RX ADMIN — AMPICILLIN SODIUM AND SULBACTAM SODIUM 100 GRAM(S): 250; 125 INJECTION, POWDER, FOR SUSPENSION INTRAMUSCULAR; INTRAVENOUS at 12:48

## 2022-07-17 RX ADMIN — OXYCODONE HYDROCHLORIDE 5 MILLIGRAM(S): 5 TABLET ORAL at 06:12

## 2022-07-17 RX ADMIN — POLYETHYLENE GLYCOL 3350 17 GRAM(S): 17 POWDER, FOR SOLUTION ORAL at 18:03

## 2022-07-17 RX ADMIN — HYDROMORPHONE HYDROCHLORIDE 0.5 MILLIGRAM(S): 2 INJECTION INTRAMUSCULAR; INTRAVENOUS; SUBCUTANEOUS at 23:33

## 2022-07-17 RX ADMIN — CHLORHEXIDINE GLUCONATE 1 APPLICATION(S): 213 SOLUTION TOPICAL at 06:13

## 2022-07-17 RX ADMIN — AMPICILLIN SODIUM AND SULBACTAM SODIUM 100 GRAM(S): 250; 125 INJECTION, POWDER, FOR SUSPENSION INTRAMUSCULAR; INTRAVENOUS at 18:03

## 2022-07-17 RX ADMIN — OXYCODONE HYDROCHLORIDE 5 MILLIGRAM(S): 5 TABLET ORAL at 15:15

## 2022-07-17 RX ADMIN — OXYCODONE HYDROCHLORIDE 5 MILLIGRAM(S): 5 TABLET ORAL at 14:11

## 2022-07-17 RX ADMIN — ENOXAPARIN SODIUM 40 MILLIGRAM(S): 100 INJECTION SUBCUTANEOUS at 11:24

## 2022-07-17 RX ADMIN — SENNA PLUS 2 TABLET(S): 8.6 TABLET ORAL at 21:11

## 2022-07-17 RX ADMIN — OXYCODONE HYDROCHLORIDE 5 MILLIGRAM(S): 5 TABLET ORAL at 22:16

## 2022-07-17 RX ADMIN — OXYCODONE HYDROCHLORIDE 5 MILLIGRAM(S): 5 TABLET ORAL at 21:46

## 2022-07-17 RX ADMIN — OXYCODONE HYDROCHLORIDE 5 MILLIGRAM(S): 5 TABLET ORAL at 07:12

## 2022-07-17 RX ADMIN — AMPICILLIN SODIUM AND SULBACTAM SODIUM 100 GRAM(S): 250; 125 INJECTION, POWDER, FOR SUSPENSION INTRAMUSCULAR; INTRAVENOUS at 06:09

## 2022-07-17 NOTE — PROGRESS NOTE ADULT - ASSESSMENT
A/P: 71M with PMHx of HTN, transferred from OSH after fall with trauma to right forearm, open laceration, s/p  vascular procedure unknown at OSH. Now s/p wound I&D 7/13.    PLAN:  - Plastics consulted for possible flap coverage/reconstruction  - Cont daily wound care; possible adaptic/xeroform gauze; no wound vac  -vascular consult    - Activity- OOB with assistance  - Pain medication as needed   - DVT ppx  - incentive spirometry      ACS, Trauma Team  Pager [phone]      A/P: 71M with PMHx of HTN, transferred from OSH after fall with trauma to right forearm, open laceration, s/p  vascular procedure unknown at OSH. Now s/p wound I&D 7/13.    PLAN:  - Plastics consulted for possible flap coverage/reconstruction. Pending Recs  - Cont daily wound care  - Activity- OOB with assistance  - Pain medication as needed   - DVT ppx  - incentive spirometry      ACS, Trauma Team  Pager [phone]

## 2022-07-18 LAB
ANION GAP SERPL CALC-SCNC: 12 MMOL/L — SIGNIFICANT CHANGE UP (ref 5–17)
BUN SERPL-MCNC: 21 MG/DL — SIGNIFICANT CHANGE UP (ref 7–23)
CALCIUM SERPL-MCNC: 8.8 MG/DL — SIGNIFICANT CHANGE UP (ref 8.4–10.5)
CHLORIDE SERPL-SCNC: 103 MMOL/L — SIGNIFICANT CHANGE UP (ref 96–108)
CO2 SERPL-SCNC: 25 MMOL/L — SIGNIFICANT CHANGE UP (ref 22–31)
CREAT SERPL-MCNC: 1.21 MG/DL — SIGNIFICANT CHANGE UP (ref 0.5–1.3)
EGFR: 65 ML/MIN/1.73M2 — SIGNIFICANT CHANGE UP
GLUCOSE SERPL-MCNC: 101 MG/DL — HIGH (ref 70–99)
HCT VFR BLD CALC: 30 % — LOW (ref 39–50)
HGB BLD-MCNC: 9.7 G/DL — LOW (ref 13–17)
MAGNESIUM SERPL-MCNC: 2.1 MG/DL — SIGNIFICANT CHANGE UP (ref 1.6–2.6)
MCHC RBC-ENTMCNC: 30.1 PG — SIGNIFICANT CHANGE UP (ref 27–34)
MCHC RBC-ENTMCNC: 32.3 GM/DL — SIGNIFICANT CHANGE UP (ref 32–36)
MCV RBC AUTO: 93.2 FL — SIGNIFICANT CHANGE UP (ref 80–100)
NRBC # BLD: 0 /100 WBCS — SIGNIFICANT CHANGE UP (ref 0–0)
PHOSPHATE SERPL-MCNC: 2.5 MG/DL — SIGNIFICANT CHANGE UP (ref 2.5–4.5)
PLATELET # BLD AUTO: 359 K/UL — SIGNIFICANT CHANGE UP (ref 150–400)
POTASSIUM SERPL-MCNC: 4.3 MMOL/L — SIGNIFICANT CHANGE UP (ref 3.5–5.3)
POTASSIUM SERPL-SCNC: 4.3 MMOL/L — SIGNIFICANT CHANGE UP (ref 3.5–5.3)
RBC # BLD: 3.22 M/UL — LOW (ref 4.2–5.8)
RBC # FLD: 13.5 % — SIGNIFICANT CHANGE UP (ref 10.3–14.5)
SODIUM SERPL-SCNC: 140 MMOL/L — SIGNIFICANT CHANGE UP (ref 135–145)
WBC # BLD: 8.39 K/UL — SIGNIFICANT CHANGE UP (ref 3.8–10.5)
WBC # FLD AUTO: 8.39 K/UL — SIGNIFICANT CHANGE UP (ref 3.8–10.5)

## 2022-07-18 PROCEDURE — 99232 SBSQ HOSP IP/OBS MODERATE 35: CPT

## 2022-07-18 RX ADMIN — HYDROMORPHONE HYDROCHLORIDE 0.5 MILLIGRAM(S): 2 INJECTION INTRAMUSCULAR; INTRAVENOUS; SUBCUTANEOUS at 05:34

## 2022-07-18 RX ADMIN — HYDROMORPHONE HYDROCHLORIDE 0.5 MILLIGRAM(S): 2 INJECTION INTRAMUSCULAR; INTRAVENOUS; SUBCUTANEOUS at 10:45

## 2022-07-18 RX ADMIN — HYDROMORPHONE HYDROCHLORIDE 0.5 MILLIGRAM(S): 2 INJECTION INTRAMUSCULAR; INTRAVENOUS; SUBCUTANEOUS at 00:03

## 2022-07-18 RX ADMIN — AMPICILLIN SODIUM AND SULBACTAM SODIUM 100 GRAM(S): 250; 125 INJECTION, POWDER, FOR SUSPENSION INTRAMUSCULAR; INTRAVENOUS at 18:00

## 2022-07-18 RX ADMIN — OXYCODONE HYDROCHLORIDE 5 MILLIGRAM(S): 5 TABLET ORAL at 10:01

## 2022-07-18 RX ADMIN — HYDROMORPHONE HYDROCHLORIDE 0.5 MILLIGRAM(S): 2 INJECTION INTRAMUSCULAR; INTRAVENOUS; SUBCUTANEOUS at 06:01

## 2022-07-18 RX ADMIN — Medication 650 MILLIGRAM(S): at 13:30

## 2022-07-18 RX ADMIN — Medication 650 MILLIGRAM(S): at 19:30

## 2022-07-18 RX ADMIN — OXYCODONE HYDROCHLORIDE 5 MILLIGRAM(S): 5 TABLET ORAL at 18:05

## 2022-07-18 RX ADMIN — ENOXAPARIN SODIUM 40 MILLIGRAM(S): 100 INJECTION SUBCUTANEOUS at 10:48

## 2022-07-18 RX ADMIN — Medication 650 MILLIGRAM(S): at 12:48

## 2022-07-18 RX ADMIN — HYDROMORPHONE HYDROCHLORIDE 0.5 MILLIGRAM(S): 2 INJECTION INTRAMUSCULAR; INTRAVENOUS; SUBCUTANEOUS at 11:10

## 2022-07-18 RX ADMIN — AMPICILLIN SODIUM AND SULBACTAM SODIUM 100 GRAM(S): 250; 125 INJECTION, POWDER, FOR SUSPENSION INTRAMUSCULAR; INTRAVENOUS at 12:20

## 2022-07-18 RX ADMIN — OXYCODONE HYDROCHLORIDE 5 MILLIGRAM(S): 5 TABLET ORAL at 18:46

## 2022-07-18 RX ADMIN — AMLODIPINE BESYLATE 10 MILLIGRAM(S): 2.5 TABLET ORAL at 05:22

## 2022-07-18 RX ADMIN — AMPICILLIN SODIUM AND SULBACTAM SODIUM 100 GRAM(S): 250; 125 INJECTION, POWDER, FOR SUSPENSION INTRAMUSCULAR; INTRAVENOUS at 05:22

## 2022-07-18 RX ADMIN — POLYETHYLENE GLYCOL 3350 17 GRAM(S): 17 POWDER, FOR SOLUTION ORAL at 05:23

## 2022-07-18 RX ADMIN — Medication 85 MILLIMOLE(S): at 12:53

## 2022-07-18 RX ADMIN — Medication 650 MILLIGRAM(S): at 18:51

## 2022-07-18 RX ADMIN — OXYCODONE HYDROCHLORIDE 5 MILLIGRAM(S): 5 TABLET ORAL at 10:45

## 2022-07-18 NOTE — PROGRESS NOTE ADULT - SUBJECTIVE AND OBJECTIVE BOX
Patient is a 71y old  Male who presents with a chief complaint of Trauma radial artery injury (18 Jul 2022 10:35)      SUBJECTIVE / OVERNIGHT EVENTS:  Pt seen and examined. No acute events overnight. He reports improvement in movement of all fingers of right hand. States that pain is well controlled.    MEDICATIONS  (STANDING):  amLODIPine   Tablet 10 milliGRAM(s) Oral daily  ampicillin/sulbactam  IVPB 1.5 Gram(s) IV Intermittent every 6 hours  enoxaparin Injectable 40 milliGRAM(s) SubCutaneous every 24 hours  polyethylene glycol 3350 17 Gram(s) Oral two times a day  senna 2 Tablet(s) Oral at bedtime    MEDICATIONS  (PRN):  acetaminophen     Tablet .. 650 milliGRAM(s) Oral every 6 hours PRN Mild Pain (1 - 3)  oxyCODONE    IR 5 milliGRAM(s) Oral every 4 hours PRN Moderate to Severe Pain (4 - 10)      Vital Signs Last 24 Hrs  T(C): 36.8 (18 Jul 2022 14:33), Max: 37.4 (17 Jul 2022 17:25)  T(F): 98.3 (18 Jul 2022 14:33), Max: 99.3 (17 Jul 2022 17:25)  HR: 79 (18 Jul 2022 14:33) (66 - 79)  BP: 150/79 (18 Jul 2022 14:33) (122/67 - 150/79)  BP(mean): --  RR: 18 (18 Jul 2022 14:33) (18 - 18)  SpO2: 93% (18 Jul 2022 14:33) (93% - 96%)    Parameters below as of 18 Jul 2022 14:33  Patient On (Oxygen Delivery Method): room air      CAPILLARY BLOOD GLUCOSE        I&O's Summary    17 Jul 2022 07:01  -  18 Jul 2022 07:00  --------------------------------------------------------  IN: 960 mL / OUT: 200 mL / NET: 760 mL    18 Jul 2022 07:01  -  18 Jul 2022 16:10  --------------------------------------------------------  IN: 600 mL / OUT: 0 mL / NET: 600 mL        PHYSICAL EXAM:  GENERAL: NAD, anicteric, afebrile  EYES: No conjunctival or scleral injection, non-icteric  RESPIRATORY:  lungs CTA without wheeze/rhonchi/rales  CARDIOVASCULAR: +S1S2, RRR, no M/G/R; pedal pulses full and symmetric; no lower extremity edema  GASTROINTESTINAL: No palpable masses or tenderness, +BS throughout, no rebound/guarding; no hernia palpated  MUSCULOSKELETAL: R arm dressed in ace bandaging w/ decr flexion/extension of 4th-5th digits  SKIN: No rashes or ulcers noted; no subcutaneous nodules or induration palpable  NEUROLOGIC:  AAOX3, sensation intact in LEs b/l to light touch  PSYCHIATRIC:  mood and affect appropriate      LABS:                        9.7    8.39  )-----------( 359      ( 18 Jul 2022 07:15 )             30.0     07-18    140  |  103  |  21  ----------------------------<  101<H>  4.3   |  25  |  1.21    Ca    8.8      18 Jul 2022 07:15  Phos  2.5     07-18  Mg     2.1     07-18    TPro  6.2  /  Alb  3.3  /  TBili  0.7  /  DBili  x   /  AST  27  /  ALT  28  /  AlkPhos  84  07-17

## 2022-07-18 NOTE — PROGRESS NOTE ADULT - SUBJECTIVE AND OBJECTIVE BOX
ACS SURGERY DAILY PROGRESS NOTE:     SUBJECTIVE/ROS: SUBJECTIVE/ROS: Patient seen and examined at bedside. Pain well controlled.     MEDICATIONS  (STANDING):  amLODIPine   Tablet 10 milliGRAM(s) Oral daily  ampicillin/sulbactam  IVPB 1.5 Gram(s) IV Intermittent every 6 hours  enoxaparin Injectable 40 milliGRAM(s) SubCutaneous every 24 hours  polyethylene glycol 3350 17 Gram(s) Oral two times a day  senna 2 Tablet(s) Oral at bedtime    MEDICATIONS  (PRN):  acetaminophen     Tablet .. 650 milliGRAM(s) Oral every 6 hours PRN Mild Pain (1 - 3)  HYDROmorphone  Injectable 0.5 milliGRAM(s) IV Push every 3 hours PRN breakthrough pain  oxyCODONE    IR 5 milliGRAM(s) Oral every 4 hours PRN Moderate to Severe Pain (4 - 10)      OBJECTIVE:    Vital Signs Last 24 Hrs  T(C): 37.2 (18 Jul 2022 05:00), Max: 37.4 (17 Jul 2022 17:25)  T(F): 98.9 (18 Jul 2022 05:00), Max: 99.3 (17 Jul 2022 17:25)  HR: 71 (18 Jul 2022 05:00) (66 - 77)  BP: 137/76 (18 Jul 2022 05:00) (122/67 - 144/74)  BP(mean): --  RR: 18 (18 Jul 2022 05:00) (17 - 18)  SpO2: 95% (18 Jul 2022 05:00) (94% - 96%)    Parameters below as of 18 Jul 2022 05:00  Patient On (Oxygen Delivery Method): room air            I&O's Detail    17 Jul 2022 07:01  -  18 Jul 2022 07:00  --------------------------------------------------------  IN:    IV PiggyBack: 100 mL    Oral Fluid: 860 mL  Total IN: 960 mL    OUT:    Voided (mL): 200 mL  Total OUT: 200 mL    Total NET: 760 mL          Daily     Daily     LABS:                        9.7    8.39  )-----------( 359      ( 18 Jul 2022 07:15 )             30.0     07-18    140  |  103  |  21  ----------------------------<  101<H>  4.3   |  25  |  1.21    Ca    8.8      18 Jul 2022 07:15  Phos  2.5     07-18  Mg     2.1     07-18    TPro  6.2  /  Alb  3.3  /  TBili  0.7  /  DBili  x   /  AST  27  /  ALT  28  /  AlkPhos  84  07-17                  PHYSICAL EXAM:    Neck: Supple  Chest: Equal expansion bilaterally, equal breath sounds  CV: Pulse regular presently  Abdomen: Soft, nontense, appropriate incisional tenderness, dressings clean and dry and intact  Extremities: RUE with dressing. Strong radial pulse.

## 2022-07-18 NOTE — PROGRESS NOTE ADULT - ASSESSMENT
A/P: 71M with PMHx of HTN, transferred from OSH after fall with trauma to right forearm, open laceration, s/p  vascular procedure unknown at OSH. Now s/p wound I&D 7/13.    PLAN:  - Plastics consulted for possible flap coverage/reconstruction. appreciate recs  - Cont daily wound care  - Activity- OOB with assistance  - Pain medication as needed   - DVT ppx  - incentive spirometry      ACS, Trauma Team  Pager [phone]

## 2022-07-18 NOTE — PROGRESS NOTE ADULT - ASSESSMENT
72 yo M PMH HTN presented as a fall from ladder adm w/ r arm traumatic wound s/p fasciotomy and wound washout, now transferred out of SICU.

## 2022-07-19 LAB
ANION GAP SERPL CALC-SCNC: 9 MMOL/L — SIGNIFICANT CHANGE UP (ref 5–17)
BUN SERPL-MCNC: 20 MG/DL — SIGNIFICANT CHANGE UP (ref 7–23)
CALCIUM SERPL-MCNC: 8.9 MG/DL — SIGNIFICANT CHANGE UP (ref 8.4–10.5)
CHLORIDE SERPL-SCNC: 100 MMOL/L — SIGNIFICANT CHANGE UP (ref 96–108)
CO2 SERPL-SCNC: 28 MMOL/L — SIGNIFICANT CHANGE UP (ref 22–31)
CREAT SERPL-MCNC: 1.12 MG/DL — SIGNIFICANT CHANGE UP (ref 0.5–1.3)
EGFR: 71 ML/MIN/1.73M2 — SIGNIFICANT CHANGE UP
GLUCOSE SERPL-MCNC: 107 MG/DL — HIGH (ref 70–99)
HCT VFR BLD CALC: 31.6 % — LOW (ref 39–50)
HGB BLD-MCNC: 10.3 G/DL — LOW (ref 13–17)
MAGNESIUM SERPL-MCNC: 2 MG/DL — SIGNIFICANT CHANGE UP (ref 1.6–2.6)
MCHC RBC-ENTMCNC: 30.7 PG — SIGNIFICANT CHANGE UP (ref 27–34)
MCHC RBC-ENTMCNC: 32.6 GM/DL — SIGNIFICANT CHANGE UP (ref 32–36)
MCV RBC AUTO: 94.3 FL — SIGNIFICANT CHANGE UP (ref 80–100)
NRBC # BLD: 0 /100 WBCS — SIGNIFICANT CHANGE UP (ref 0–0)
PHOSPHATE SERPL-MCNC: 2.6 MG/DL — SIGNIFICANT CHANGE UP (ref 2.5–4.5)
PLATELET # BLD AUTO: 433 K/UL — HIGH (ref 150–400)
POTASSIUM SERPL-MCNC: 4.3 MMOL/L — SIGNIFICANT CHANGE UP (ref 3.5–5.3)
POTASSIUM SERPL-SCNC: 4.3 MMOL/L — SIGNIFICANT CHANGE UP (ref 3.5–5.3)
RBC # BLD: 3.35 M/UL — LOW (ref 4.2–5.8)
RBC # FLD: 13.6 % — SIGNIFICANT CHANGE UP (ref 10.3–14.5)
SODIUM SERPL-SCNC: 137 MMOL/L — SIGNIFICANT CHANGE UP (ref 135–145)
WBC # BLD: 8.97 K/UL — SIGNIFICANT CHANGE UP (ref 3.8–10.5)
WBC # FLD AUTO: 8.97 K/UL — SIGNIFICANT CHANGE UP (ref 3.8–10.5)

## 2022-07-19 RX ADMIN — OXYCODONE HYDROCHLORIDE 5 MILLIGRAM(S): 5 TABLET ORAL at 18:45

## 2022-07-19 RX ADMIN — AMLODIPINE BESYLATE 10 MILLIGRAM(S): 2.5 TABLET ORAL at 05:42

## 2022-07-19 RX ADMIN — AMPICILLIN SODIUM AND SULBACTAM SODIUM 100 GRAM(S): 250; 125 INJECTION, POWDER, FOR SUSPENSION INTRAMUSCULAR; INTRAVENOUS at 17:25

## 2022-07-19 RX ADMIN — OXYCODONE HYDROCHLORIDE 5 MILLIGRAM(S): 5 TABLET ORAL at 00:21

## 2022-07-19 RX ADMIN — OXYCODONE HYDROCHLORIDE 5 MILLIGRAM(S): 5 TABLET ORAL at 06:12

## 2022-07-19 RX ADMIN — OXYCODONE HYDROCHLORIDE 5 MILLIGRAM(S): 5 TABLET ORAL at 00:51

## 2022-07-19 RX ADMIN — SENNA PLUS 2 TABLET(S): 8.6 TABLET ORAL at 21:06

## 2022-07-19 RX ADMIN — OXYCODONE HYDROCHLORIDE 5 MILLIGRAM(S): 5 TABLET ORAL at 05:42

## 2022-07-19 RX ADMIN — OXYCODONE HYDROCHLORIDE 5 MILLIGRAM(S): 5 TABLET ORAL at 12:07

## 2022-07-19 RX ADMIN — OXYCODONE HYDROCHLORIDE 5 MILLIGRAM(S): 5 TABLET ORAL at 13:03

## 2022-07-19 RX ADMIN — AMPICILLIN SODIUM AND SULBACTAM SODIUM 100 GRAM(S): 250; 125 INJECTION, POWDER, FOR SUSPENSION INTRAMUSCULAR; INTRAVENOUS at 00:18

## 2022-07-19 RX ADMIN — AMPICILLIN SODIUM AND SULBACTAM SODIUM 100 GRAM(S): 250; 125 INJECTION, POWDER, FOR SUSPENSION INTRAMUSCULAR; INTRAVENOUS at 05:42

## 2022-07-19 RX ADMIN — OXYCODONE HYDROCHLORIDE 5 MILLIGRAM(S): 5 TABLET ORAL at 17:25

## 2022-07-19 RX ADMIN — AMPICILLIN SODIUM AND SULBACTAM SODIUM 100 GRAM(S): 250; 125 INJECTION, POWDER, FOR SUSPENSION INTRAMUSCULAR; INTRAVENOUS at 11:59

## 2022-07-19 RX ADMIN — ENOXAPARIN SODIUM 40 MILLIGRAM(S): 100 INJECTION SUBCUTANEOUS at 11:59

## 2022-07-19 NOTE — PROGRESS NOTE ADULT - ASSESSMENT
71M with PMHx of HTN, transferred from OSH after fall with trauma to right forearm, open laceration, s/p  vascular procedure unknown at OSH. Now s/p wound I&D 7/13.    PLAN:  - Plastics consulted for possible flap coverage/reconstruction. appreciate recs  - Cont daily wound care  - Activity- OOB with assistance  - Pain medication as needed   - DVT ppx  - incentive spirometry

## 2022-07-19 NOTE — PROGRESS NOTE ADULT - SUBJECTIVE AND OBJECTIVE BOX
Patient evaluated yesterday afternoon as well as this afternoon.   Patient reports that he is feeling well and has been performing his own, bedside OT.     PE: R hand warm and well-perfused; open volar wound with exposed muscles; slight, superficial increase in dusky-appearance of proximal 1/3 of forearm musculature, however, muscle with good turgor; no drainage; no cellulitis; no grossly exposed vessels     A+P: extensive R forearm open wound with slight increase in superficial, dusky-appearance of proximal 1/3 muscular     -patient is doing well and is tolerating dressing changes; will change to wet-to-dry dressing changes q12 hours  -strict elevation R hand/forearm  -continue bedside OT   -will plan for STSG when wound stabilizes   -discussed with plastic surgery residents and trauma attending

## 2022-07-19 NOTE — PROGRESS NOTE ADULT - SUBJECTIVE AND OBJECTIVE BOX
GENERAL SURGERY PROGRESS NOTE      SUBJECTIVE  Patient seen and examined at bedside. Pain well controlled.    OVERNIGHT EVENTS:  No acute events overnight   10-point review of systems completed and negative except as noted above.      OBJECTIVE    MEDICATIONS  acetaminophen     Tablet .. 650 milliGRAM(s) Oral every 6 hours PRN  amLODIPine   Tablet 10 milliGRAM(s) Oral daily  ampicillin/sulbactam  IVPB 1.5 Gram(s) IV Intermittent every 6 hours  enoxaparin Injectable 40 milliGRAM(s) SubCutaneous every 24 hours  oxyCODONE    IR 5 milliGRAM(s) Oral every 4 hours PRN  polyethylene glycol 3350 17 Gram(s) Oral two times a day  senna 2 Tablet(s) Oral at bedtime      PHYSICAL EXAM  T(C): 37 (07-19-22 @ 10:33), Max: 37.2 (07-18-22 @ 18:04)  HR: 92 (07-19-22 @ 11:00) (74 - 97)  BP: 156/90 (07-19-22 @ 11:00) (145/78 - 166/88)  RR: 18 (07-19-22 @ 10:53) (18 - 18)  SpO2: 96% (07-19-22 @ 10:53) (93% - 97%)    07-18-22 @ 07:01  -  07-19-22 @ 07:00  --------------------------------------------------------  IN: 1390 mL / OUT: 0 mL / NET: 1390 mL    07-19-22 @ 07:01  -  07-19-22 @ 14:10  --------------------------------------------------------  IN: 350 mL / OUT: 0 mL / NET: 350 mL        General: Appears well, NAD  Neuro: AAOx3  CHEST: Clear to auscultation bilaterally  CV: Regular rate and rhythm  Abdomen: soft, nontender, nondistended, no rebound or guarding  Extremities: Grossly symmetric    LABS                        10.3   8.97  )-----------( 433      ( 19 Jul 2022 07:08 )             31.6     07-19    137  |  100  |  20  ----------------------------<  107<H>  4.3   |  28  |  1.12    Ca    8.9      19 Jul 2022 07:10  Phos  2.6     07-19  Mg     2.0     07-19            RADIOLOGY & ADDITIONAL STUDIES

## 2022-07-20 DIAGNOSIS — Z01.818 ENCOUNTER FOR OTHER PREPROCEDURAL EXAMINATION: ICD-10-CM

## 2022-07-20 LAB — SARS-COV-2 RNA SPEC QL NAA+PROBE: SIGNIFICANT CHANGE UP

## 2022-07-20 PROCEDURE — 99232 SBSQ HOSP IP/OBS MODERATE 35: CPT

## 2022-07-20 RX ADMIN — OXYCODONE HYDROCHLORIDE 5 MILLIGRAM(S): 5 TABLET ORAL at 11:40

## 2022-07-20 RX ADMIN — AMPICILLIN SODIUM AND SULBACTAM SODIUM 100 GRAM(S): 250; 125 INJECTION, POWDER, FOR SUSPENSION INTRAMUSCULAR; INTRAVENOUS at 12:19

## 2022-07-20 RX ADMIN — OXYCODONE HYDROCHLORIDE 5 MILLIGRAM(S): 5 TABLET ORAL at 01:46

## 2022-07-20 RX ADMIN — OXYCODONE HYDROCHLORIDE 5 MILLIGRAM(S): 5 TABLET ORAL at 10:57

## 2022-07-20 RX ADMIN — OXYCODONE HYDROCHLORIDE 5 MILLIGRAM(S): 5 TABLET ORAL at 18:14

## 2022-07-20 RX ADMIN — AMPICILLIN SODIUM AND SULBACTAM SODIUM 100 GRAM(S): 250; 125 INJECTION, POWDER, FOR SUSPENSION INTRAMUSCULAR; INTRAVENOUS at 18:15

## 2022-07-20 RX ADMIN — OXYCODONE HYDROCHLORIDE 5 MILLIGRAM(S): 5 TABLET ORAL at 19:00

## 2022-07-20 RX ADMIN — OXYCODONE HYDROCHLORIDE 5 MILLIGRAM(S): 5 TABLET ORAL at 00:55

## 2022-07-20 RX ADMIN — ENOXAPARIN SODIUM 40 MILLIGRAM(S): 100 INJECTION SUBCUTANEOUS at 11:01

## 2022-07-20 RX ADMIN — OXYCODONE HYDROCHLORIDE 5 MILLIGRAM(S): 5 TABLET ORAL at 23:44

## 2022-07-20 RX ADMIN — AMPICILLIN SODIUM AND SULBACTAM SODIUM 100 GRAM(S): 250; 125 INJECTION, POWDER, FOR SUSPENSION INTRAMUSCULAR; INTRAVENOUS at 05:24

## 2022-07-20 RX ADMIN — AMPICILLIN SODIUM AND SULBACTAM SODIUM 100 GRAM(S): 250; 125 INJECTION, POWDER, FOR SUSPENSION INTRAMUSCULAR; INTRAVENOUS at 00:49

## 2022-07-20 RX ADMIN — POLYETHYLENE GLYCOL 3350 17 GRAM(S): 17 POWDER, FOR SOLUTION ORAL at 05:24

## 2022-07-20 RX ADMIN — AMLODIPINE BESYLATE 10 MILLIGRAM(S): 2.5 TABLET ORAL at 05:24

## 2022-07-20 NOTE — PROGRESS NOTE ADULT - SUBJECTIVE AND OBJECTIVE BOX
Patient is a 71y old  Male who presents with a chief complaint of Trauma radial artery injury (20 Jul 2022 07:42)      SUBJECTIVE / OVERNIGHT EVENTS:  Pt seen and examined with son at bedside. No acute events overnight. Pt c/o right UE pain. Denies cp, sob, fever/chills.    MEDICATIONS  (STANDING):  amLODIPine   Tablet 10 milliGRAM(s) Oral daily  ampicillin/sulbactam  IVPB 1.5 Gram(s) IV Intermittent every 6 hours  enoxaparin Injectable 40 milliGRAM(s) SubCutaneous every 24 hours  polyethylene glycol 3350 17 Gram(s) Oral two times a day  senna 2 Tablet(s) Oral at bedtime    MEDICATIONS  (PRN):  acetaminophen     Tablet .. 650 milliGRAM(s) Oral every 6 hours PRN Mild Pain (1 - 3)  oxyCODONE    IR 5 milliGRAM(s) Oral every 4 hours PRN Moderate to Severe Pain (4 - 10)      Vital Signs Last 24 Hrs  T(C): 36.8 (20 Jul 2022 05:59), Max: 37 (19 Jul 2022 10:33)  T(F): 98.3 (20 Jul 2022 05:59), Max: 98.6 (19 Jul 2022 10:33)  HR: 68 (20 Jul 2022 05:59) (68 - 107)  BP: 150/74 (20 Jul 2022 05:59) (134/77 - 166/88)  BP(mean): --  RR: 18 (20 Jul 2022 05:59) (18 - 18)  SpO2: 95% (20 Jul 2022 05:59) (94% - 96%)    Parameters below as of 20 Jul 2022 05:59  Patient On (Oxygen Delivery Method): room air      CAPILLARY BLOOD GLUCOSE        I&O's Summary    19 Jul 2022 07:01  -  20 Jul 2022 07:00  --------------------------------------------------------  IN: 850 mL / OUT: 0 mL / NET: 850 mL        PHYSICAL EXAM:  GENERAL: NAD, anicteric, afebrile  EYES: No conjunctival or scleral injection, non-icteric  RESPIRATORY:  lungs CTA without wheeze/rhonchi/rales  CARDIOVASCULAR: +S1S2, RRR, no M/G/R; pedal pulses full and symmetric; no lower extremity edema  GASTROINTESTINAL: No palpable masses or tenderness, +BS throughout, no rebound/guarding; no hernia palpated  MUSCULOSKELETAL: R arm dressed in ace bandaging w/ decr flexion/extension of 4th-5th digits  SKIN: No rashes or ulcers noted; no subcutaneous nodules or induration palpable  NEUROLOGIC:  AAOX3, sensation intact in LEs b/l to light touch  PSYCHIATRIC:  mood and affect appropriate    LABS:                        10.3   8.97  )-----------( 433      ( 19 Jul 2022 07:08 )             31.6     07-19    137  |  100  |  20  ----------------------------<  107<H>  4.3   |  28  |  1.12    Ca    8.9      19 Jul 2022 07:10  Phos  2.6     07-19  Mg     2.0     07-19        EKG Personally Reviewed: 7/15/22  NORMAL SINUS RHYTHM ; HR 70 bpm, No acute ischemic changes

## 2022-07-20 NOTE — PROGRESS NOTE ADULT - ASSESSMENT
71M with PMHx of HTN, transferred from OSH after fall with trauma to right forearm, open laceration, s/p  vascular procedure unknown at OSH. Now s/p wound I&D 7/13.    PLAN:  - Plastics consulted for possible flap coverage/reconstruction. appreciate recs  - Cont two times a day wound care  - Activity- OOB with assistance  - Pain medication as needed   - DVT ppx  - incentive spirometry    ACS  [id number]

## 2022-07-20 NOTE — PROGRESS NOTE ADULT - SUBJECTIVE AND OBJECTIVE BOX
SURGERY DAILY PROGRESS NOTE:       SUBJECTIVE/ROS: Patient seen and evaluated on AM rounds. Patient feels ok. No overnight events. Tolerating a diet.   Denies nausea, vomiting, chest pain, shortness of breath       OBJECTIVE:    Vital Signs Last 24 Hrs  T(C): 36.8 (20 Jul 2022 05:59), Max: 37 (19 Jul 2022 10:33)  T(F): 98.3 (20 Jul 2022 05:59), Max: 98.6 (19 Jul 2022 10:33)  HR: 68 (20 Jul 2022 05:59) (68 - 107)  BP: 150/74 (20 Jul 2022 05:59) (134/77 - 166/88)  BP(mean): --  RR: 18 (20 Jul 2022 05:59) (18 - 18)  SpO2: 95% (20 Jul 2022 05:59) (94% - 96%)    Parameters below as of 20 Jul 2022 05:59  Patient On (Oxygen Delivery Method): room air      I&O's Detail    19 Jul 2022 07:01  -  20 Jul 2022 07:00  --------------------------------------------------------  IN:    Oral Fluid: 850 mL  Total IN: 850 mL    OUT:  Total OUT: 0 mL    Total NET: 850 mL        Daily     Daily   MEDICATIONS  (STANDING):  amLODIPine   Tablet 10 milliGRAM(s) Oral daily  ampicillin/sulbactam  IVPB 1.5 Gram(s) IV Intermittent every 6 hours  enoxaparin Injectable 40 milliGRAM(s) SubCutaneous every 24 hours  polyethylene glycol 3350 17 Gram(s) Oral two times a day  senna 2 Tablet(s) Oral at bedtime    MEDICATIONS  (PRN):  acetaminophen     Tablet .. 650 milliGRAM(s) Oral every 6 hours PRN Mild Pain (1 - 3)  oxyCODONE    IR 5 milliGRAM(s) Oral every 4 hours PRN Moderate to Severe Pain (4 - 10)      LABS:                        10.3   8.97  )-----------( 433      ( 19 Jul 2022 07:08 )             31.6     07-19    137  |  100  |  20  ----------------------------<  107<H>  4.3   |  28  |  1.12    Ca    8.9      19 Jul 2022 07:10  Phos  2.6     07-19  Mg     2.0     07-19      Physical Exam:   General: Appears well, NAD  Neuro: AAOx3  CHEST: non labored breathing on room air   CV: Regular rate and rhythm  Abdomen: soft, nontender, nondistended, no rebound or guarding  Extremities: right arm in ACE wrap and splint

## 2022-07-21 LAB
ALBUMIN SERPL ELPH-MCNC: 3.5 G/DL — SIGNIFICANT CHANGE UP (ref 3.3–5)
ALP SERPL-CCNC: 98 U/L — SIGNIFICANT CHANGE UP (ref 40–120)
ALT FLD-CCNC: 36 U/L — SIGNIFICANT CHANGE UP (ref 10–45)
ANION GAP SERPL CALC-SCNC: 10 MMOL/L — SIGNIFICANT CHANGE UP (ref 5–17)
AST SERPL-CCNC: 21 U/L — SIGNIFICANT CHANGE UP (ref 10–40)
BILIRUB SERPL-MCNC: 0.3 MG/DL — SIGNIFICANT CHANGE UP (ref 0.2–1.2)
BUN SERPL-MCNC: 24 MG/DL — HIGH (ref 7–23)
CALCIUM SERPL-MCNC: 8.9 MG/DL — SIGNIFICANT CHANGE UP (ref 8.4–10.5)
CHLORIDE SERPL-SCNC: 104 MMOL/L — SIGNIFICANT CHANGE UP (ref 96–108)
CO2 SERPL-SCNC: 26 MMOL/L — SIGNIFICANT CHANGE UP (ref 22–31)
CREAT SERPL-MCNC: 1.17 MG/DL — SIGNIFICANT CHANGE UP (ref 0.5–1.3)
EGFR: 67 ML/MIN/1.73M2 — SIGNIFICANT CHANGE UP
GLUCOSE SERPL-MCNC: 106 MG/DL — HIGH (ref 70–99)
HCT VFR BLD CALC: 29.9 % — LOW (ref 39–50)
HGB BLD-MCNC: 9.6 G/DL — LOW (ref 13–17)
MAGNESIUM SERPL-MCNC: 2.2 MG/DL — SIGNIFICANT CHANGE UP (ref 1.6–2.6)
MCHC RBC-ENTMCNC: 30.4 PG — SIGNIFICANT CHANGE UP (ref 27–34)
MCHC RBC-ENTMCNC: 32.1 GM/DL — SIGNIFICANT CHANGE UP (ref 32–36)
MCV RBC AUTO: 94.6 FL — SIGNIFICANT CHANGE UP (ref 80–100)
NRBC # BLD: 0 /100 WBCS — SIGNIFICANT CHANGE UP (ref 0–0)
PHOSPHATE SERPL-MCNC: 2.5 MG/DL — SIGNIFICANT CHANGE UP (ref 2.5–4.5)
PLATELET # BLD AUTO: 417 K/UL — HIGH (ref 150–400)
POTASSIUM SERPL-MCNC: 4.6 MMOL/L — SIGNIFICANT CHANGE UP (ref 3.5–5.3)
POTASSIUM SERPL-SCNC: 4.6 MMOL/L — SIGNIFICANT CHANGE UP (ref 3.5–5.3)
PROT SERPL-MCNC: 6.4 G/DL — SIGNIFICANT CHANGE UP (ref 6–8.3)
RBC # BLD: 3.16 M/UL — LOW (ref 4.2–5.8)
RBC # FLD: 13.8 % — SIGNIFICANT CHANGE UP (ref 10.3–14.5)
SODIUM SERPL-SCNC: 140 MMOL/L — SIGNIFICANT CHANGE UP (ref 135–145)
WBC # BLD: 8.8 K/UL — SIGNIFICANT CHANGE UP (ref 3.8–10.5)
WBC # FLD AUTO: 8.8 K/UL — SIGNIFICANT CHANGE UP (ref 3.8–10.5)

## 2022-07-21 PROCEDURE — 99232 SBSQ HOSP IP/OBS MODERATE 35: CPT

## 2022-07-21 RX ADMIN — AMLODIPINE BESYLATE 10 MILLIGRAM(S): 2.5 TABLET ORAL at 06:15

## 2022-07-21 RX ADMIN — OXYCODONE HYDROCHLORIDE 5 MILLIGRAM(S): 5 TABLET ORAL at 23:03

## 2022-07-21 RX ADMIN — OXYCODONE HYDROCHLORIDE 5 MILLIGRAM(S): 5 TABLET ORAL at 00:44

## 2022-07-21 RX ADMIN — Medication 85 MILLIMOLE(S): at 12:25

## 2022-07-21 RX ADMIN — OXYCODONE HYDROCHLORIDE 5 MILLIGRAM(S): 5 TABLET ORAL at 23:33

## 2022-07-21 RX ADMIN — AMPICILLIN SODIUM AND SULBACTAM SODIUM 100 GRAM(S): 250; 125 INJECTION, POWDER, FOR SUSPENSION INTRAMUSCULAR; INTRAVENOUS at 13:31

## 2022-07-21 RX ADMIN — AMPICILLIN SODIUM AND SULBACTAM SODIUM 100 GRAM(S): 250; 125 INJECTION, POWDER, FOR SUSPENSION INTRAMUSCULAR; INTRAVENOUS at 23:04

## 2022-07-21 RX ADMIN — AMPICILLIN SODIUM AND SULBACTAM SODIUM 100 GRAM(S): 250; 125 INJECTION, POWDER, FOR SUSPENSION INTRAMUSCULAR; INTRAVENOUS at 17:58

## 2022-07-21 RX ADMIN — AMPICILLIN SODIUM AND SULBACTAM SODIUM 100 GRAM(S): 250; 125 INJECTION, POWDER, FOR SUSPENSION INTRAMUSCULAR; INTRAVENOUS at 00:39

## 2022-07-21 RX ADMIN — AMPICILLIN SODIUM AND SULBACTAM SODIUM 100 GRAM(S): 250; 125 INJECTION, POWDER, FOR SUSPENSION INTRAMUSCULAR; INTRAVENOUS at 06:14

## 2022-07-21 RX ADMIN — ENOXAPARIN SODIUM 40 MILLIGRAM(S): 100 INJECTION SUBCUTANEOUS at 12:25

## 2022-07-21 RX ADMIN — OXYCODONE HYDROCHLORIDE 5 MILLIGRAM(S): 5 TABLET ORAL at 12:24

## 2022-07-21 RX ADMIN — Medication 650 MILLIGRAM(S): at 23:33

## 2022-07-21 RX ADMIN — OXYCODONE HYDROCHLORIDE 5 MILLIGRAM(S): 5 TABLET ORAL at 18:41

## 2022-07-21 RX ADMIN — Medication 650 MILLIGRAM(S): at 23:03

## 2022-07-21 NOTE — PROGRESS NOTE ADULT - SUBJECTIVE AND OBJECTIVE BOX
SURGERY DAILY PROGRESS NOTE:     SUBJECTIVE/ROS: Patient seen and evaluated on AM rounds. Pain is controlled. No overnight events. Tolerating a diet.        MEDICATIONS  (STANDING):  amLODIPine   Tablet 10 milliGRAM(s) Oral daily  ampicillin/sulbactam  IVPB 1.5 Gram(s) IV Intermittent every 6 hours  enoxaparin Injectable 40 milliGRAM(s) SubCutaneous every 24 hours  polyethylene glycol 3350 17 Gram(s) Oral two times a day  senna 2 Tablet(s) Oral at bedtime    MEDICATIONS  (PRN):  acetaminophen     Tablet .. 650 milliGRAM(s) Oral every 6 hours PRN Mild Pain (1 - 3)  oxyCODONE    IR 5 milliGRAM(s) Oral every 4 hours PRN Moderate to Severe Pain (4 - 10)      OBJECTIVE:    Vital Signs Last 24 Hrs  T(C): 36.7 (2022 06:00), Max: 37.1 (2022 01:00)  T(F): 98.1 (2022 06:00), Max: 98.8 (2022 01:00)  HR: 73 (2022 06:00) (62 - 81)  BP: 152/83 (2022 06:00) (128/74 - 152/83)  BP(mean): --  RR: 18 (2022 06:00) (18 - 18)  SpO2: 93% (2022 06:00) (93% - 96%)    Parameters below as of 2022 06:00  Patient On (Oxygen Delivery Method): room air            I&O's Detail    2022 07:01  -  2022 07:00  --------------------------------------------------------  IN:    Oral Fluid: 850 mL  Total IN: 850 mL    OUT:  Total OUT: 0 mL    Total NET: 850 mL      2022 07:01  -  2022 06:54  --------------------------------------------------------  IN:    IV PiggyBack: 100 mL    Oral Fluid: 860 mL  Total IN: 960 mL    OUT:  Total OUT: 0 mL    Total NET: 960 mL          Daily     Daily Weight in k.1 (2022 10:28)    LABS:                        10.3   8.97  )-----------( 433      ( 2022 07:08 )             31.6     07-19    137  |  100  |  20  ----------------------------<  107<H>  4.3   |  28  |  1.12    Ca    8.9      2022 07:10  Phos  2.6       Mg     2.0             Physical Exam:   General: Appears well, NAD  Neuro: AAOx3  CHEST: non labored breathing on room air   CV: Regular rate and rhythm  Abdomen: soft, nontender, nondistended, no rebound or guarding  Extremities: right arm in ACE wrap and splint

## 2022-07-21 NOTE — PROGRESS NOTE ADULT - SUBJECTIVE AND OBJECTIVE BOX
Patient is a 71y old  Male who presents with a chief complaint of Trauma radial artery injury (21 Jul 2022 11:16)      SUBJECTIVE / OVERNIGHT EVENTS:  Pt seen and examined. No acute events overnight. He states that right arm pain is well controlled.     MEDICATIONS  (STANDING):  amLODIPine   Tablet 10 milliGRAM(s) Oral daily  ampicillin/sulbactam  IVPB 1.5 Gram(s) IV Intermittent every 6 hours  enoxaparin Injectable 40 milliGRAM(s) SubCutaneous every 24 hours  polyethylene glycol 3350 17 Gram(s) Oral two times a day  senna 2 Tablet(s) Oral at bedtime    MEDICATIONS  (PRN):  acetaminophen     Tablet .. 650 milliGRAM(s) Oral every 6 hours PRN Mild Pain (1 - 3)  oxyCODONE    IR 5 milliGRAM(s) Oral every 4 hours PRN Moderate to Severe Pain (4 - 10)      Vital Signs Last 24 Hrs  T(C): 37 (21 Jul 2022 10:06), Max: 37.1 (21 Jul 2022 01:00)  T(F): 98.6 (21 Jul 2022 10:06), Max: 98.8 (21 Jul 2022 01:00)  HR: 70 (21 Jul 2022 10:06) (62 - 73)  BP: 137/72 (21 Jul 2022 10:06) (128/74 - 152/83)  BP(mean): --  RR: 18 (21 Jul 2022 10:06) (18 - 18)  SpO2: 96% (21 Jul 2022 10:06) (93% - 96%)    Parameters below as of 21 Jul 2022 10:06  Patient On (Oxygen Delivery Method): room air      CAPILLARY BLOOD GLUCOSE        I&O's Summary    20 Jul 2022 07:01  -  21 Jul 2022 07:00  --------------------------------------------------------  IN: 1110 mL / OUT: 0 mL / NET: 1110 mL    21 Jul 2022 07:01  -  21 Jul 2022 13:36  --------------------------------------------------------  IN: 480 mL / OUT: 0 mL / NET: 480 mL        PHYSICAL EXAM:  GENERAL: NAD, anicteric, afebrile  EYES: No conjunctival or scleral injection, non-icteric  RESPIRATORY:  lungs CTA without wheeze/rhonchi/rales  CARDIOVASCULAR: +S1S2, RRR, no M/G/R; pedal pulses full and symmetric; no lower extremity edema  GASTROINTESTINAL: No palpable masses or tenderness, +BS throughout, no rebound/guarding; no hernia palpated  MUSCULOSKELETAL: R arm dressing CDI ; warm to touch ; well perfused digits ; improving  flexion/extension of 4th-5th digits  SKIN: No rashes or ulcers noted; no subcutaneous nodules or induration palpable  NEUROLOGIC:  AAOX3, nonfocal  PSYCHIATRIC:  mood and affect appropriate    LABS:                        9.6    8.80  )-----------( 417      ( 21 Jul 2022 07:09 )             29.9     07-21    140  |  104  |  24<H>  ----------------------------<  106<H>  4.6   |  26  |  1.17    Ca    8.9      21 Jul 2022 07:09  Phos  2.5     07-21  Mg     2.2     07-21    TPro  6.4  /  Alb  3.5  /  TBili  0.3  /  DBili  x   /  AST  21  /  ALT  36  /  AlkPhos  98  07-21

## 2022-07-21 NOTE — PROGRESS NOTE ADULT - SUBJECTIVE AND OBJECTIVE BOX
Patient sitting in chair comfortably with splint in place.   Continues on IV antibiotics and participating in bedside OT.   Requesting non-narcotic pain medications PRN and would like to decrease narcotics.    PE: R hand well perfused; +open volar wound from elbow to distal wrist crease; proximal, tracking wound at angio cut-down site decreasing in size; proximal muscle as well as surrounding subcutaneous tissue clean with increased granulation tissue; distal soft tissue and muscle also clean and granulating; muscles at mid, volar forearm with duskiness with increasing SA over the last several days; no drainage and no cellulitis; no frankly visible vessels in the wound; distal aspect radial artery covered but readily palpated at distal wrist crease    A+P: HD #8 s/p industrial accident transferred from OSH with extensive R forearm open wound, avulsed proximal volar muscles with ulnar artery reconstruction with cephalic v graft (now occluded) and volar fasciotomy at [facility]. Also s/p RUE washout by trauma team on HD#1    -patient's hand remains viable and warm with radial artery filling an intact palmar arch; case was discussed in detail with Dr. Riddle at [facility] on HD#1   as well as with the trauma attendings at [facility] and both expressed concerns for muscle ischemia mid-forearm secondary to the degree of initial injury as well as their respective intraoperative findings  -the patient has been followed closely as an inpatient with dressing changes and IV antibiotics; there has been no evidence of infection R upper extremity. He has been doing well, however, there was evidence muscle duskiness on exam which has been increasing in surface area over the last several days. Dressings have included both xeroform as well as wet-to-dry dressings. It is unclear if there is also a component of muscle dessication in addition to necrosis. The plan was to allow the wound to demarcate/stabilize prior to debridement and skin graft as there were post-op concerns for muscle viability by both the trauma team and the patient's outside vascular surgeon.   -case discussed with trauma attending this evening and some consideration may be given to VAC therapy at a low setting or alternatively, changing the dressing order to xeroform and hydrogel Q shift in the case that exam findings also have a component of muscle dessication in addition to muscle necrosis  -I also discussed the findings at length with the patient and he understands the severity of his injury and that he is still at risk of limb loss  -please call [phone] with any acute changes on exam    Patient sitting in chair comfortably with splint in place.   Continues on IV antibiotics and participating in bedside OT.   Requesting non-narcotic pain medications PRN and would like to decrease narcotics.    PE: R hand well perfused; +open volar wound from elbow to distal wrist crease; proximal, tracking wound at angio cut-down site decreasing in size; proximal muscle as well as surrounding subcutaneous tissue clean with increased granulation tissue; distal soft tissue and muscle also clean and granulating; muscles at mid, volar forearm with duskiness with increasing surface area over the last several days; no drainage or cellulitis; no frankly visible vessels in the wound; distal aspect radial artery covered but readily palpated at distal wrist crease    A+P: HD #8 s/p industrial accident transferred from OSH with extensive R forearm open wound, avulsed proximal volar muscles with ulnar artery intimal flap and reconstruction with cephalic v graft (now occluded) and volar fasciotomy at [facility]. Also s/p RUE washout by trauma team on HD#1    -patient's hand remains viable and warm with radial artery filling an intact palmar arch; case was discussed in detail with Dr. Riddle at [facility] on HD#1   as well as with the trauma attendings at [facility] and both expressed concerns for muscle ischemia mid-forearm secondary to the degree of initial injury as well as their respective intraoperative findings  -the patient has been followed closely as an inpatient with dressing changes and IV antibiotics; there has been no evidence of infection R upper extremity. He has been doing well, however, there was evidence muscle duskiness on exam which has been increasing in surface area over the last several days. Dressings have included both xeroform as well as wet-to-dry dressings. It is unclear if there is also a component of muscle dessication in addition to necrosis. The plan was to allow the wound to demarcate/stabilize prior to debridement and skin grafting as there were post-op concerns for muscle viability by both the trauma team and the patient's outside vascular surgeon.   -case discussed with trauma attending this evening and some consideration may be given to VAC therapy at a low setting (60 mm Hg) or alternatively, changing the dressing order to xeroform and hydrogel Q shift in the case that exam findings also have a component of muscle dessication in addition to muscle necrosis  -I also discussed the findings at length with the patient and he understands the severity of his injury and that he is still at risk of limb loss  -continue OT, splint, elevation   -please call [phone] with any acute changes on exam

## 2022-07-21 NOTE — PROGRESS NOTE ADULT - ASSESSMENT
71M with PMHx of HTN, transferred from OSH after fall with trauma to right forearm, open laceration, s/p  vascular procedure unknown at OSH. Now s/p wound I&D 7/13.    PLAN:  - Plastics following for possible flap coverage/reconstruction. appreciate recs  - Cont two times a day wound care  - Activity- OOB with assistance  - Pain medication as needed   - DVT ppx  - incentive spirometry    ACS  [id number]

## 2022-07-22 LAB
ANION GAP SERPL CALC-SCNC: 8 MMOL/L — SIGNIFICANT CHANGE UP (ref 5–17)
BUN SERPL-MCNC: 22 MG/DL — SIGNIFICANT CHANGE UP (ref 7–23)
CALCIUM SERPL-MCNC: 8.7 MG/DL — SIGNIFICANT CHANGE UP (ref 8.4–10.5)
CHLORIDE SERPL-SCNC: 103 MMOL/L — SIGNIFICANT CHANGE UP (ref 96–108)
CO2 SERPL-SCNC: 29 MMOL/L — SIGNIFICANT CHANGE UP (ref 22–31)
CREAT SERPL-MCNC: 1.11 MG/DL — SIGNIFICANT CHANGE UP (ref 0.5–1.3)
EGFR: 72 ML/MIN/1.73M2 — SIGNIFICANT CHANGE UP
GLUCOSE SERPL-MCNC: 106 MG/DL — HIGH (ref 70–99)
HCT VFR BLD CALC: 29.8 % — LOW (ref 39–50)
HGB BLD-MCNC: 9.6 G/DL — LOW (ref 13–17)
MAGNESIUM SERPL-MCNC: 2.1 MG/DL — SIGNIFICANT CHANGE UP (ref 1.6–2.6)
MCHC RBC-ENTMCNC: 30.6 PG — SIGNIFICANT CHANGE UP (ref 27–34)
MCHC RBC-ENTMCNC: 32.2 GM/DL — SIGNIFICANT CHANGE UP (ref 32–36)
MCV RBC AUTO: 94.9 FL — SIGNIFICANT CHANGE UP (ref 80–100)
NRBC # BLD: 0 /100 WBCS — SIGNIFICANT CHANGE UP (ref 0–0)
PHOSPHATE SERPL-MCNC: 3.2 MG/DL — SIGNIFICANT CHANGE UP (ref 2.5–4.5)
PLATELET # BLD AUTO: 434 K/UL — HIGH (ref 150–400)
POTASSIUM SERPL-MCNC: 3.7 MMOL/L — SIGNIFICANT CHANGE UP (ref 3.5–5.3)
POTASSIUM SERPL-SCNC: 3.7 MMOL/L — SIGNIFICANT CHANGE UP (ref 3.5–5.3)
RBC # BLD: 3.14 M/UL — LOW (ref 4.2–5.8)
RBC # FLD: 13.9 % — SIGNIFICANT CHANGE UP (ref 10.3–14.5)
SODIUM SERPL-SCNC: 140 MMOL/L — SIGNIFICANT CHANGE UP (ref 135–145)
WBC # BLD: 10.11 K/UL — SIGNIFICANT CHANGE UP (ref 3.8–10.5)
WBC # FLD AUTO: 10.11 K/UL — SIGNIFICANT CHANGE UP (ref 3.8–10.5)

## 2022-07-22 PROCEDURE — 99232 SBSQ HOSP IP/OBS MODERATE 35: CPT

## 2022-07-22 RX ORDER — OXYCODONE HYDROCHLORIDE 5 MG/1
5 TABLET ORAL EVERY 6 HOURS
Refills: 0 | Status: DISCONTINUED | OUTPATIENT
Start: 2022-07-22 | End: 2022-07-27

## 2022-07-22 RX ORDER — POTASSIUM CHLORIDE 20 MEQ
40 PACKET (EA) ORAL ONCE
Refills: 0 | Status: COMPLETED | OUTPATIENT
Start: 2022-07-22 | End: 2022-07-22

## 2022-07-22 RX ORDER — OXYCODONE HYDROCHLORIDE 5 MG/1
10 TABLET ORAL EVERY 6 HOURS
Refills: 0 | Status: DISCONTINUED | OUTPATIENT
Start: 2022-07-22 | End: 2022-07-27

## 2022-07-22 RX ADMIN — Medication 650 MILLIGRAM(S): at 12:30

## 2022-07-22 RX ADMIN — OXYCODONE HYDROCHLORIDE 5 MILLIGRAM(S): 5 TABLET ORAL at 22:47

## 2022-07-22 RX ADMIN — AMPICILLIN SODIUM AND SULBACTAM SODIUM 100 GRAM(S): 250; 125 INJECTION, POWDER, FOR SUSPENSION INTRAMUSCULAR; INTRAVENOUS at 18:04

## 2022-07-22 RX ADMIN — Medication 650 MILLIGRAM(S): at 12:00

## 2022-07-22 RX ADMIN — Medication 650 MILLIGRAM(S): at 06:19

## 2022-07-22 RX ADMIN — AMPICILLIN SODIUM AND SULBACTAM SODIUM 100 GRAM(S): 250; 125 INJECTION, POWDER, FOR SUSPENSION INTRAMUSCULAR; INTRAVENOUS at 12:00

## 2022-07-22 RX ADMIN — AMPICILLIN SODIUM AND SULBACTAM SODIUM 100 GRAM(S): 250; 125 INJECTION, POWDER, FOR SUSPENSION INTRAMUSCULAR; INTRAVENOUS at 05:49

## 2022-07-22 RX ADMIN — Medication 650 MILLIGRAM(S): at 05:49

## 2022-07-22 RX ADMIN — AMPICILLIN SODIUM AND SULBACTAM SODIUM 100 GRAM(S): 250; 125 INJECTION, POWDER, FOR SUSPENSION INTRAMUSCULAR; INTRAVENOUS at 23:25

## 2022-07-22 RX ADMIN — Medication 650 MILLIGRAM(S): at 18:05

## 2022-07-22 RX ADMIN — ENOXAPARIN SODIUM 40 MILLIGRAM(S): 100 INJECTION SUBCUTANEOUS at 12:00

## 2022-07-22 RX ADMIN — AMLODIPINE BESYLATE 10 MILLIGRAM(S): 2.5 TABLET ORAL at 05:49

## 2022-07-22 RX ADMIN — OXYCODONE HYDROCHLORIDE 5 MILLIGRAM(S): 5 TABLET ORAL at 21:47

## 2022-07-22 RX ADMIN — Medication 650 MILLIGRAM(S): at 18:58

## 2022-07-22 RX ADMIN — Medication 40 MILLIEQUIVALENT(S): at 12:00

## 2022-07-22 NOTE — PROGRESS NOTE ADULT - ASSESSMENT
71M with PMHx of HTN, transferred from OSH after fall with trauma to right forearm, open laceration, s/p  vascular procedure unknown at OSH. Now s/p wound I&D 7/13.    PLAN:  - Plastics following for possible flap coverage/reconstruction. appreciate recs  - Cont two times a day wound care  - Activity- OOB with assistance  - Pain medication as needed   - DVT ppx  - incentive spirometry    ACS  [id number]

## 2022-07-22 NOTE — CONSULT NOTE ADULT - ASSESSMENT
1. Lesions on scalp c/w AKs which patient endorses hx of 1. Lesions on scalp c/w actinic keratoses which patient endorses hx of  -at this time, recommend out patient follow up with his personal dermatologist for management and treatment    2. Seborrheic dermatitis, scalp, well controlled.   -please provide ketoconazole 2% shampoo per patient request for seborrheic dermatitis of the scalp while in house if available.  -for best effect should be use 2-3 times per week and left on 5 minutes prior to rinsing    Thank you for allowing us to participate in the care of this pt. Dermatology to sign off at this time. Please notify us and re-consult as needed for new or concerning changes to skin exam.    The patient's chart was reviewed in addition to being seen and discussed remotely with the dermatology attending Dr. Lucero. Recommendations were communicated with the primary team.  Please page [phone] w/10 digit call back number for further related questions.    Hayden Peres MD  Resident Physician, PGY3  [facility]  Pager: [phone]  Office: [phone]

## 2022-07-22 NOTE — PROGRESS NOTE ADULT - SUBJECTIVE AND OBJECTIVE BOX
Patient is a 71y old  Male who presents with a chief complaint of Trauma radial artery injury (22 Jul 2022 08:28)      SUBJECTIVE / OVERNIGHT EVENTS: Pt seen and examined. No acute events overnight. States that pain is well controlled.    MEDICATIONS  (STANDING):  amLODIPine   Tablet 10 milliGRAM(s) Oral daily  ampicillin/sulbactam  IVPB 1.5 Gram(s) IV Intermittent every 6 hours  enoxaparin Injectable 40 milliGRAM(s) SubCutaneous every 24 hours  polyethylene glycol 3350 17 Gram(s) Oral two times a day  senna 2 Tablet(s) Oral at bedtime    MEDICATIONS  (PRN):  acetaminophen     Tablet .. 650 milliGRAM(s) Oral every 6 hours PRN Mild Pain (1 - 3)  oxyCODONE    IR 5 milliGRAM(s) Oral every 6 hours PRN Moderate Pain (4 - 6)  oxyCODONE    IR 10 milliGRAM(s) Oral every 6 hours PRN Severe Pain (7 - 10)      Vital Signs Last 24 Hrs  T(C): 36.9 (22 Jul 2022 08:59), Max: 37.3 (21 Jul 2022 22:00)  T(F): 98.4 (22 Jul 2022 08:59), Max: 99.1 (21 Jul 2022 22:00)  HR: 74 (22 Jul 2022 08:59) (74 - 93)  BP: 147/76 (22 Jul 2022 08:59) (130/78 - 156/84)  BP(mean): --  RR: 18 (22 Jul 2022 08:59) (18 - 18)  SpO2: 99% (22 Jul 2022 08:59) (93% - 99%)    Parameters below as of 22 Jul 2022 08:59  Patient On (Oxygen Delivery Method): room air      CAPILLARY BLOOD GLUCOSE        I&O's Summary    21 Jul 2022 07:01  -  22 Jul 2022 07:00  --------------------------------------------------------  IN: 1460 mL / OUT: 0 mL / NET: 1460 mL        PHYSICAL EXAM:  GENERAL: NAD, anicteric, afebrile  EYES: No conjunctival or scleral injection, non-icteric  RESPIRATORY:  lungs CTA without wheeze/rhonchi/rales  CARDIOVASCULAR: +S1S2, RRR, no M/G/R; pedal pulses full and symmetric; no lower extremity edema  GASTROINTESTINAL: No palpable masses or tenderness, +BS throughout, no rebound/guarding; no hernia palpated  MUSCULOSKELETAL: R arm dressing CDI ; warm to touch ; well perfused digits ; improving  flexion/extension of 4th-5th digits  SKIN: No rashes or ulcers noted; no subcutaneous nodules or induration palpable  NEUROLOGIC:  AAOX3, nonfocal  PSYCHIATRIC:  mood and affect appropriate    LABS:                        9.6    10.11 )-----------( 434      ( 22 Jul 2022 07:22 )             29.8     07-22    140  |  103  |  22  ----------------------------<  106<H>  3.7   |  29  |  1.11    Ca    8.7      22 Jul 2022 07:22  Phos  3.2     07-22  Mg     2.1     07-22    TPro  6.4  /  Alb  3.5  /  TBili  0.3  /  DBili  x   /  AST  21  /  ALT  36  /  AlkPhos  98  07-21

## 2022-07-22 NOTE — CONSULT NOTE ADULT - CONSULT REASON
AKs on scalp
R upper extremity open wound
s/p traumatic fall with open R arm wound
RUE trauma vascular eval
medical management

## 2022-07-22 NOTE — PROGRESS NOTE ADULT - SUBJECTIVE AND OBJECTIVE BOX
TRAUMA SURGERY DAILY PROGRESS NOTE:     SUBJECTIVE/ROS: Patient seen and examined. feels well, resting comfortably.      MEDICATIONS  (STANDING):  amLODIPine   Tablet 10 milliGRAM(s) Oral daily  ampicillin/sulbactam  IVPB 1.5 Gram(s) IV Intermittent every 6 hours  enoxaparin Injectable 40 milliGRAM(s) SubCutaneous every 24 hours  polyethylene glycol 3350 17 Gram(s) Oral two times a day  senna 2 Tablet(s) Oral at bedtime    MEDICATIONS  (PRN):  acetaminophen     Tablet .. 650 milliGRAM(s) Oral every 6 hours PRN Mild Pain (1 - 3)  oxyCODONE    IR 5 milliGRAM(s) Oral every 6 hours PRN Moderate Pain (4 - 6)  oxyCODONE    IR 10 milliGRAM(s) Oral every 6 hours PRN Severe Pain (7 - 10)      OBJECTIVE:    Vital Signs Last 24 Hrs  T(C): 36.8 (22 Jul 2022 05:02), Max: 37.3 (21 Jul 2022 22:00)  T(F): 98.3 (22 Jul 2022 05:02), Max: 99.1 (21 Jul 2022 22:00)  HR: 77 (22 Jul 2022 05:02) (70 - 93)  BP: 130/78 (22 Jul 2022 05:02) (130/78 - 156/84)  BP(mean): --  RR: 18 (22 Jul 2022 05:02) (18 - 18)  SpO2: 95% (22 Jul 2022 05:02) (93% - 99%)    Parameters below as of 22 Jul 2022 05:02  Patient On (Oxygen Delivery Method): room air            I&O's Detail    21 Jul 2022 07:01  -  22 Jul 2022 07:00  --------------------------------------------------------  IN:    IV PiggyBack: 100 mL    Oral Fluid: 1360 mL  Total IN: 1460 mL    OUT:  Total OUT: 0 mL    Total NET: 1460 mL          Daily     Daily     LABS:                        9.6    10.11 )-----------( 434      ( 22 Jul 2022 07:22 )             29.8     07-22    140  |  103  |  22  ----------------------------<  106<H>  3.7   |  29  |  1.11    Ca    8.7      22 Jul 2022 07:22  Phos  3.2     07-22  Mg     2.1     07-22    TPro  6.4  /  Alb  3.5  /  TBili  0.3  /  DBili  x   /  AST  21  /  ALT  36  /  AlkPhos  98  07-21                  PHYSICAL EXAM:    General: Appears well, NAD  Neuro: AAOx3  CHEST: non labored breathing on room air   CV: Regular rate and rhythm  Abdomen: soft, nontender, nondistended, no rebound or guarding  Extremities: right arm in ACE wrap and splint

## 2022-07-23 RX ADMIN — AMLODIPINE BESYLATE 10 MILLIGRAM(S): 2.5 TABLET ORAL at 05:50

## 2022-07-23 RX ADMIN — POLYETHYLENE GLYCOL 3350 17 GRAM(S): 17 POWDER, FOR SOLUTION ORAL at 05:49

## 2022-07-23 RX ADMIN — ENOXAPARIN SODIUM 40 MILLIGRAM(S): 100 INJECTION SUBCUTANEOUS at 11:17

## 2022-07-23 RX ADMIN — AMPICILLIN SODIUM AND SULBACTAM SODIUM 100 GRAM(S): 250; 125 INJECTION, POWDER, FOR SUSPENSION INTRAMUSCULAR; INTRAVENOUS at 23:37

## 2022-07-23 RX ADMIN — OXYCODONE HYDROCHLORIDE 5 MILLIGRAM(S): 5 TABLET ORAL at 06:47

## 2022-07-23 RX ADMIN — OXYCODONE HYDROCHLORIDE 10 MILLIGRAM(S): 5 TABLET ORAL at 15:53

## 2022-07-23 RX ADMIN — AMPICILLIN SODIUM AND SULBACTAM SODIUM 100 GRAM(S): 250; 125 INJECTION, POWDER, FOR SUSPENSION INTRAMUSCULAR; INTRAVENOUS at 05:49

## 2022-07-23 RX ADMIN — OXYCODONE HYDROCHLORIDE 10 MILLIGRAM(S): 5 TABLET ORAL at 23:37

## 2022-07-23 RX ADMIN — AMPICILLIN SODIUM AND SULBACTAM SODIUM 100 GRAM(S): 250; 125 INJECTION, POWDER, FOR SUSPENSION INTRAMUSCULAR; INTRAVENOUS at 17:12

## 2022-07-23 RX ADMIN — OXYCODONE HYDROCHLORIDE 10 MILLIGRAM(S): 5 TABLET ORAL at 14:56

## 2022-07-23 RX ADMIN — AMPICILLIN SODIUM AND SULBACTAM SODIUM 100 GRAM(S): 250; 125 INJECTION, POWDER, FOR SUSPENSION INTRAMUSCULAR; INTRAVENOUS at 11:17

## 2022-07-23 RX ADMIN — OXYCODONE HYDROCHLORIDE 5 MILLIGRAM(S): 5 TABLET ORAL at 05:57

## 2022-07-23 NOTE — PROGRESS NOTE ADULT - SUBJECTIVE AND OBJECTIVE BOX
GENERAL SURGERY PROGRESS NOTE    no issues overnight  mild right hand swelling    10-point review of systems completed and negative except as noted above.      OBJECTIVE    MEDICATIONS  acetaminophen     Tablet .. 650 milliGRAM(s) Oral every 6 hours PRN  amLODIPine   Tablet 10 milliGRAM(s) Oral daily  ampicillin/sulbactam  IVPB 1.5 Gram(s) IV Intermittent every 6 hours  enoxaparin Injectable 40 milliGRAM(s) SubCutaneous every 24 hours  oxyCODONE    IR 5 milliGRAM(s) Oral every 6 hours PRN  oxyCODONE    IR 10 milliGRAM(s) Oral every 6 hours PRN  polyethylene glycol 3350 17 Gram(s) Oral two times a day  senna 2 Tablet(s) Oral at bedtime      PHYSICAL EXAM  T(C): 37.2 (07-23-22 @ 11:06), Max: 37.5 (07-23-22 @ 05:47)  HR: 75 (07-23-22 @ 11:06) (67 - 76)  BP: 127/74 (07-23-22 @ 11:06) (127/74 - 145/80)  RR: 18 (07-23-22 @ 11:06) (18 - 18)  SpO2: 94% (07-23-22 @ 11:06) (93% - 96%)    07-22-22 @ 07:01  -  07-23-22 @ 07:00  --------------------------------------------------------  IN: 1160 mL / OUT: 0 mL / NET: 1160 mL        General: Appears well, NAD  Neuro: AAOx3  CHEST: Clear to auscultation bilaterally  CV: Regular rate and rhythm  Abdomen: soft, nontender, nondistended, no rebound or guarding  Extremities: muscle necrosis < 30% surface wound,     LABS                        9.6    10.11 )-----------( 434      ( 22 Jul 2022 07:22 )             29.8     07-22    140  |  103  |  22  ----------------------------<  106<H>  3.7   |  29  |  1.11    Ca    8.7      22 Jul 2022 07:22  Phos  3.2     07-22  Mg     2.1     07-22            RADIOLOGY & ADDITIONAL STUDIES

## 2022-07-23 NOTE — PROGRESS NOTE ADULT - ASSESSMENT
71M with PMHx of HTN, transferred from OSH after fall with trauma to right forearm, open laceration, s/p  vascular procedure unknown at OSH. Now s/p wound I&D 7/13.    PLAN:  - Plastics following for possible flap coverage/reconstruction. appreciate recs  - Possible OR in conjunction with plastics 7/26 for muscle debridement  - order RUE duplex r/o dvt  - Cont two times a day wound care per nursing with xeroform and hydrogel  - Activity- OOB with assistance  - Pain medication as needed   - DVT ppx  - incentive spirometry    ACS  [id number]

## 2022-07-24 LAB
ALBUMIN SERPL ELPH-MCNC: 3.3 G/DL — SIGNIFICANT CHANGE UP (ref 3.3–5)
ALP SERPL-CCNC: 97 U/L — SIGNIFICANT CHANGE UP (ref 40–120)
ALT FLD-CCNC: 24 U/L — SIGNIFICANT CHANGE UP (ref 10–45)
ANION GAP SERPL CALC-SCNC: 9 MMOL/L — SIGNIFICANT CHANGE UP (ref 5–17)
AST SERPL-CCNC: 18 U/L — SIGNIFICANT CHANGE UP (ref 10–40)
BILIRUB SERPL-MCNC: 0.4 MG/DL — SIGNIFICANT CHANGE UP (ref 0.2–1.2)
BUN SERPL-MCNC: 19 MG/DL — SIGNIFICANT CHANGE UP (ref 7–23)
CALCIUM SERPL-MCNC: 8.9 MG/DL — SIGNIFICANT CHANGE UP (ref 8.4–10.5)
CHLORIDE SERPL-SCNC: 102 MMOL/L — SIGNIFICANT CHANGE UP (ref 96–108)
CO2 SERPL-SCNC: 27 MMOL/L — SIGNIFICANT CHANGE UP (ref 22–31)
CREAT SERPL-MCNC: 1.15 MG/DL — SIGNIFICANT CHANGE UP (ref 0.5–1.3)
EGFR: 69 ML/MIN/1.73M2 — SIGNIFICANT CHANGE UP
GLUCOSE SERPL-MCNC: 94 MG/DL — SIGNIFICANT CHANGE UP (ref 70–99)
HCT VFR BLD CALC: 31.4 % — LOW (ref 39–50)
HGB BLD-MCNC: 10.1 G/DL — LOW (ref 13–17)
MAGNESIUM SERPL-MCNC: 2.1 MG/DL — SIGNIFICANT CHANGE UP (ref 1.6–2.6)
MCHC RBC-ENTMCNC: 30.3 PG — SIGNIFICANT CHANGE UP (ref 27–34)
MCHC RBC-ENTMCNC: 32.2 GM/DL — SIGNIFICANT CHANGE UP (ref 32–36)
MCV RBC AUTO: 94.3 FL — SIGNIFICANT CHANGE UP (ref 80–100)
NRBC # BLD: 0 /100 WBCS — SIGNIFICANT CHANGE UP (ref 0–0)
PHOSPHATE SERPL-MCNC: 2.8 MG/DL — SIGNIFICANT CHANGE UP (ref 2.5–4.5)
PLATELET # BLD AUTO: 434 K/UL — HIGH (ref 150–400)
POTASSIUM SERPL-MCNC: 4.4 MMOL/L — SIGNIFICANT CHANGE UP (ref 3.5–5.3)
POTASSIUM SERPL-SCNC: 4.4 MMOL/L — SIGNIFICANT CHANGE UP (ref 3.5–5.3)
PROT SERPL-MCNC: 6.6 G/DL — SIGNIFICANT CHANGE UP (ref 6–8.3)
RBC # BLD: 3.33 M/UL — LOW (ref 4.2–5.8)
RBC # FLD: 14 % — SIGNIFICANT CHANGE UP (ref 10.3–14.5)
SODIUM SERPL-SCNC: 138 MMOL/L — SIGNIFICANT CHANGE UP (ref 135–145)
WBC # BLD: 9.7 K/UL — SIGNIFICANT CHANGE UP (ref 3.8–10.5)
WBC # FLD AUTO: 9.7 K/UL — SIGNIFICANT CHANGE UP (ref 3.8–10.5)

## 2022-07-24 PROCEDURE — 93970 EXTREMITY STUDY: CPT | Mod: 26

## 2022-07-24 RX ADMIN — AMPICILLIN SODIUM AND SULBACTAM SODIUM 100 GRAM(S): 250; 125 INJECTION, POWDER, FOR SUSPENSION INTRAMUSCULAR; INTRAVENOUS at 23:07

## 2022-07-24 RX ADMIN — OXYCODONE HYDROCHLORIDE 10 MILLIGRAM(S): 5 TABLET ORAL at 12:42

## 2022-07-24 RX ADMIN — SENNA PLUS 2 TABLET(S): 8.6 TABLET ORAL at 21:58

## 2022-07-24 RX ADMIN — OXYCODONE HYDROCHLORIDE 10 MILLIGRAM(S): 5 TABLET ORAL at 00:07

## 2022-07-24 RX ADMIN — AMPICILLIN SODIUM AND SULBACTAM SODIUM 100 GRAM(S): 250; 125 INJECTION, POWDER, FOR SUSPENSION INTRAMUSCULAR; INTRAVENOUS at 06:10

## 2022-07-24 RX ADMIN — POLYETHYLENE GLYCOL 3350 17 GRAM(S): 17 POWDER, FOR SOLUTION ORAL at 06:10

## 2022-07-24 RX ADMIN — OXYCODONE HYDROCHLORIDE 10 MILLIGRAM(S): 5 TABLET ORAL at 13:30

## 2022-07-24 RX ADMIN — AMPICILLIN SODIUM AND SULBACTAM SODIUM 100 GRAM(S): 250; 125 INJECTION, POWDER, FOR SUSPENSION INTRAMUSCULAR; INTRAVENOUS at 18:23

## 2022-07-24 RX ADMIN — AMLODIPINE BESYLATE 10 MILLIGRAM(S): 2.5 TABLET ORAL at 06:09

## 2022-07-24 RX ADMIN — OXYCODONE HYDROCHLORIDE 10 MILLIGRAM(S): 5 TABLET ORAL at 21:58

## 2022-07-24 RX ADMIN — AMPICILLIN SODIUM AND SULBACTAM SODIUM 100 GRAM(S): 250; 125 INJECTION, POWDER, FOR SUSPENSION INTRAMUSCULAR; INTRAVENOUS at 12:37

## 2022-07-24 RX ADMIN — OXYCODONE HYDROCHLORIDE 10 MILLIGRAM(S): 5 TABLET ORAL at 22:28

## 2022-07-24 RX ADMIN — ENOXAPARIN SODIUM 40 MILLIGRAM(S): 100 INJECTION SUBCUTANEOUS at 12:38

## 2022-07-24 NOTE — PROGRESS NOTE ADULT - SUBJECTIVE AND OBJECTIVE BOX
GENERAL SURGERY PROGRESS NOTE    persistent hand swelling  pain well controlled  dressing changes tolerated with improving muscle viability    10-point review of systems completed and negative except as noted above.      OBJECTIVE    MEDICATIONS  acetaminophen     Tablet .. 650 milliGRAM(s) Oral every 6 hours PRN  amLODIPine   Tablet 10 milliGRAM(s) Oral daily  ampicillin/sulbactam  IVPB 1.5 Gram(s) IV Intermittent every 6 hours  enoxaparin Injectable 40 milliGRAM(s) SubCutaneous every 24 hours  oxyCODONE    IR 5 milliGRAM(s) Oral every 6 hours PRN  oxyCODONE    IR 10 milliGRAM(s) Oral every 6 hours PRN  polyethylene glycol 3350 17 Gram(s) Oral two times a day  senna 2 Tablet(s) Oral at bedtime      PHYSICAL EXAM  T(C): 37.2 (07-23-22 @ 11:06), Max: 37.5 (07-23-22 @ 05:47)  HR: 75 (07-23-22 @ 11:06) (67 - 76)  BP: 127/74 (07-23-22 @ 11:06) (127/74 - 145/80)  RR: 18 (07-23-22 @ 11:06) (18 - 18)  SpO2: 94% (07-23-22 @ 11:06) (93% - 96%)    07-22-22 @ 07:01  -  07-23-22 @ 07:00  --------------------------------------------------------  IN: 1160 mL / OUT: 0 mL / NET: 1160 mL        General: Appears well, NAD  Neuro: AAOx3  CHEST: Clear to auscultation bilaterally  CV: Regular rate and rhythm  Abdomen: soft, nontender, nondistended, no rebound or guarding  Extremities: muscle necrosis < 30% surface wound,     LABS                        9.6    10.11 )-----------( 434      ( 22 Jul 2022 07:22 )             29.8     07-22    140  |  103  |  22  ----------------------------<  106<H>  3.7   |  29  |  1.11    Ca    8.7      22 Jul 2022 07:22  Phos  3.2     07-22  Mg     2.1     07-22            RADIOLOGY & ADDITIONAL STUDIES

## 2022-07-25 LAB
ALBUMIN SERPL ELPH-MCNC: 3.5 G/DL — SIGNIFICANT CHANGE UP (ref 3.3–5)
ALP SERPL-CCNC: 98 U/L — SIGNIFICANT CHANGE UP (ref 40–120)
ALT FLD-CCNC: 23 U/L — SIGNIFICANT CHANGE UP (ref 10–45)
ANION GAP SERPL CALC-SCNC: 10 MMOL/L — SIGNIFICANT CHANGE UP (ref 5–17)
AST SERPL-CCNC: 18 U/L — SIGNIFICANT CHANGE UP (ref 10–40)
BASOPHILS # BLD AUTO: 0.05 K/UL — SIGNIFICANT CHANGE UP (ref 0–0.2)
BASOPHILS NFR BLD AUTO: 0.6 % — SIGNIFICANT CHANGE UP (ref 0–2)
BILIRUB SERPL-MCNC: 0.3 MG/DL — SIGNIFICANT CHANGE UP (ref 0.2–1.2)
BUN SERPL-MCNC: 25 MG/DL — HIGH (ref 7–23)
CALCIUM SERPL-MCNC: 9 MG/DL — SIGNIFICANT CHANGE UP (ref 8.4–10.5)
CHLORIDE SERPL-SCNC: 104 MMOL/L — SIGNIFICANT CHANGE UP (ref 96–108)
CO2 SERPL-SCNC: 26 MMOL/L — SIGNIFICANT CHANGE UP (ref 22–31)
CREAT SERPL-MCNC: 1.3 MG/DL — SIGNIFICANT CHANGE UP (ref 0.5–1.3)
EGFR: 59 ML/MIN/1.73M2 — LOW
EOSINOPHIL # BLD AUTO: 0.18 K/UL — SIGNIFICANT CHANGE UP (ref 0–0.5)
EOSINOPHIL NFR BLD AUTO: 2.1 % — SIGNIFICANT CHANGE UP (ref 0–6)
GLUCOSE SERPL-MCNC: 106 MG/DL — HIGH (ref 70–99)
HCT VFR BLD CALC: 31.1 % — LOW (ref 39–50)
HGB BLD-MCNC: 10.2 G/DL — LOW (ref 13–17)
IMM GRANULOCYTES NFR BLD AUTO: 1.2 % — SIGNIFICANT CHANGE UP (ref 0–1.5)
LYMPHOCYTES # BLD AUTO: 1.75 K/UL — SIGNIFICANT CHANGE UP (ref 1–3.3)
LYMPHOCYTES # BLD AUTO: 20.7 % — SIGNIFICANT CHANGE UP (ref 13–44)
MAGNESIUM SERPL-MCNC: 2.1 MG/DL — SIGNIFICANT CHANGE UP (ref 1.6–2.6)
MCHC RBC-ENTMCNC: 30.7 PG — SIGNIFICANT CHANGE UP (ref 27–34)
MCHC RBC-ENTMCNC: 32.8 GM/DL — SIGNIFICANT CHANGE UP (ref 32–36)
MCV RBC AUTO: 93.7 FL — SIGNIFICANT CHANGE UP (ref 80–100)
MONOCYTES # BLD AUTO: 0.67 K/UL — SIGNIFICANT CHANGE UP (ref 0–0.9)
MONOCYTES NFR BLD AUTO: 7.9 % — SIGNIFICANT CHANGE UP (ref 2–14)
NEUTROPHILS # BLD AUTO: 5.7 K/UL — SIGNIFICANT CHANGE UP (ref 1.8–7.4)
NEUTROPHILS NFR BLD AUTO: 67.5 % — SIGNIFICANT CHANGE UP (ref 43–77)
NRBC # BLD: 0 /100 WBCS — SIGNIFICANT CHANGE UP (ref 0–0)
PHOSPHATE SERPL-MCNC: 3.1 MG/DL — SIGNIFICANT CHANGE UP (ref 2.5–4.5)
PLATELET # BLD AUTO: 417 K/UL — HIGH (ref 150–400)
POTASSIUM SERPL-MCNC: 4.2 MMOL/L — SIGNIFICANT CHANGE UP (ref 3.5–5.3)
POTASSIUM SERPL-SCNC: 4.2 MMOL/L — SIGNIFICANT CHANGE UP (ref 3.5–5.3)
PROT SERPL-MCNC: 6.5 G/DL — SIGNIFICANT CHANGE UP (ref 6–8.3)
RBC # BLD: 3.32 M/UL — LOW (ref 4.2–5.8)
RBC # FLD: 13.7 % — SIGNIFICANT CHANGE UP (ref 10.3–14.5)
SARS-COV-2 RNA SPEC QL NAA+PROBE: SIGNIFICANT CHANGE UP
SODIUM SERPL-SCNC: 140 MMOL/L — SIGNIFICANT CHANGE UP (ref 135–145)
WBC # BLD: 8.45 K/UL — SIGNIFICANT CHANGE UP (ref 3.8–10.5)
WBC # FLD AUTO: 8.45 K/UL — SIGNIFICANT CHANGE UP (ref 3.8–10.5)

## 2022-07-25 PROCEDURE — 99233 SBSQ HOSP IP/OBS HIGH 50: CPT

## 2022-07-25 PROCEDURE — 99232 SBSQ HOSP IP/OBS MODERATE 35: CPT | Mod: 57

## 2022-07-25 RX ORDER — VALSARTAN 80 MG/1
320 TABLET ORAL DAILY
Refills: 0 | Status: DISCONTINUED | OUTPATIENT
Start: 2022-07-25 | End: 2022-07-27

## 2022-07-25 RX ORDER — SODIUM CHLORIDE 9 MG/ML
1000 INJECTION, SOLUTION INTRAVENOUS
Refills: 0 | Status: DISCONTINUED | OUTPATIENT
Start: 2022-07-25 | End: 2022-07-27

## 2022-07-25 RX ADMIN — AMPICILLIN SODIUM AND SULBACTAM SODIUM 100 GRAM(S): 250; 125 INJECTION, POWDER, FOR SUSPENSION INTRAMUSCULAR; INTRAVENOUS at 05:32

## 2022-07-25 RX ADMIN — AMPICILLIN SODIUM AND SULBACTAM SODIUM 100 GRAM(S): 250; 125 INJECTION, POWDER, FOR SUSPENSION INTRAMUSCULAR; INTRAVENOUS at 12:37

## 2022-07-25 RX ADMIN — OXYCODONE HYDROCHLORIDE 10 MILLIGRAM(S): 5 TABLET ORAL at 18:25

## 2022-07-25 RX ADMIN — AMPICILLIN SODIUM AND SULBACTAM SODIUM 100 GRAM(S): 250; 125 INJECTION, POWDER, FOR SUSPENSION INTRAMUSCULAR; INTRAVENOUS at 17:19

## 2022-07-25 RX ADMIN — ENOXAPARIN SODIUM 40 MILLIGRAM(S): 100 INJECTION SUBCUTANEOUS at 12:37

## 2022-07-25 RX ADMIN — AMLODIPINE BESYLATE 10 MILLIGRAM(S): 2.5 TABLET ORAL at 05:32

## 2022-07-25 RX ADMIN — OXYCODONE HYDROCHLORIDE 10 MILLIGRAM(S): 5 TABLET ORAL at 17:55

## 2022-07-25 RX ADMIN — POLYETHYLENE GLYCOL 3350 17 GRAM(S): 17 POWDER, FOR SOLUTION ORAL at 05:32

## 2022-07-25 NOTE — PROGRESS NOTE ADULT - SUBJECTIVE AND OBJECTIVE BOX
GENERAL SURGERY PROGRESS NOTE    STATUS POST:    POST OPERATIVE DAY #:       SUBJECTIVE    OVERNIGHT EVENTS:    10-point review of systems completed and negative except as noted above.      OBJECTIVE    MEDICATIONS  acetaminophen     Tablet .. 650 milliGRAM(s) Oral every 6 hours PRN  amLODIPine   Tablet 10 milliGRAM(s) Oral daily  ampicillin/sulbactam  IVPB 1.5 Gram(s) IV Intermittent every 6 hours  enoxaparin Injectable 40 milliGRAM(s) SubCutaneous every 24 hours  oxyCODONE    IR 5 milliGRAM(s) Oral every 6 hours PRN  oxyCODONE    IR 10 milliGRAM(s) Oral every 6 hours PRN  polyethylene glycol 3350 17 Gram(s) Oral two times a day  senna 2 Tablet(s) Oral at bedtime      PHYSICAL EXAM  T(C): 37.4 (07-25-22 @ 05:23), Max: 37.4 (07-25-22 @ 01:19)  HR: 77 (07-25-22 @ 05:23) (67 - 99)  BP: 143/79 (07-25-22 @ 05:23) (106/72 - 145/83)  RR: 18 (07-25-22 @ 05:23) (17 - 18)  SpO2: 93% (07-25-22 @ 05:23) (93% - 99%)    07-24-22 @ 07:01  -  07-25-22 @ 07:00  --------------------------------------------------------  IN: 1280 mL / OUT: 0 mL / NET: 1280 mL        General: Appears well, NAD  Neuro: AAOx3  CHEST: Clear to auscultation bilaterally  CV: Regular rate and rhythm  Abdomen: soft, nontender, nondistended, no rebound or guarding  Extremities: Grossly symmetric    LABS                        10.2   8.45  )-----------( 417      ( 25 Jul 2022 06:05 )             31.1     07-25    140  |  104  |  25<H>  ----------------------------<  106<H>  4.2   |  26  |  1.30    Ca    9.0      25 Jul 2022 06:05  Phos  3.1     07-25  Mg     2.1     07-25    TPro  6.5  /  Alb  3.5  /  TBili  0.3  /  DBili  x   /  AST  18  /  ALT  23  /  AlkPhos  98  07-25          RADIOLOGY & ADDITIONAL STUDIES

## 2022-07-25 NOTE — PROGRESS NOTE ADULT - ASSESSMENT
71M with PMHx of HTN, transferred from OSH after fall with trauma to right forearm, open laceration, s/p  vascular procedure unknown at OSH. Now s/p wound I&D 7/13.    PLAN:  - Plastics following for possible split thickness graft to Right Forearm wound  - Possible OR in conjunction with plastics 7/26  - (-) UE Duplex  - Cont two times a day wound care per nursing with xeroform and hydrogel  - Activity- OOB with assistance  - Pain medication as needed   - DVT ppx  - incentive spirometry    ACS  [id number]

## 2022-07-25 NOTE — PROGRESS NOTE ADULT - SUBJECTIVE AND OBJECTIVE BOX
Patient is a 71y old  Male who presents with a chief complaint of Trauma radial artery injury (25 Jul 2022 07:58)      SUBJECTIVE / OVERNIGHT EVENTS: discussed with plastics at bedside while doing a debridement of the arm, pt feels ok, denies cp, sob, abd pain     MEDICATIONS  (STANDING):  amLODIPine   Tablet 10 milliGRAM(s) Oral daily  ampicillin/sulbactam  IVPB 1.5 Gram(s) IV Intermittent every 6 hours  enoxaparin Injectable 40 milliGRAM(s) SubCutaneous every 24 hours  polyethylene glycol 3350 17 Gram(s) Oral two times a day  senna 2 Tablet(s) Oral at bedtime    MEDICATIONS  (PRN):  acetaminophen     Tablet .. 650 milliGRAM(s) Oral every 6 hours PRN Mild Pain (1 - 3)  oxyCODONE    IR 5 milliGRAM(s) Oral every 6 hours PRN Moderate Pain (4 - 6)  oxyCODONE    IR 10 milliGRAM(s) Oral every 6 hours PRN Severe Pain (7 - 10)        CAPILLARY BLOOD GLUCOSE        I&O's Summary    24 Jul 2022 07:01  -  25 Jul 2022 07:00  --------------------------------------------------------  IN: 1280 mL / OUT: 0 mL / NET: 1280 mL    25 Jul 2022 07:01  -  25 Jul 2022 12:02  --------------------------------------------------------  IN: 300 mL / OUT: 0 mL / NET: 300 mL        PHYSICAL EXAM:  GENERAL: NAD, well-developed  HEAD:  Atraumatic, Normocephalic  EYES: conjunctiva and sclera clear  NECK:  No JVD  CHEST/LUNG: Clear to auscultation bilaterally; No wheeze  HEART: Regular rate and rhythm;S1S2  ABDOMEN: Soft, Nontender, Nondistended; Bowel sounds present  EXTREMITIES:  Right arm open wound       LABS:                        10.2   8.45  )-----------( 417      ( 25 Jul 2022 06:05 )             31.1     07-25    140  |  104  |  25<H>  ----------------------------<  106<H>  4.2   |  26  |  1.30    Ca    9.0      25 Jul 2022 06:05  Phos  3.1     07-25  Mg     2.1     07-25    TPro  6.5  /  Alb  3.5  /  TBili  0.3  /  DBili  x   /  AST  18  /  ALT  23  /  AlkPhos  98  07-25              RADIOLOGY & ADDITIONAL TESTS:    Imaging Personally Reviewed:    Consultant(s) Notes Reviewed:      Care Discussed with Consultants/Other Providers:

## 2022-07-26 ENCOUNTER — TRANSCRIPTION ENCOUNTER (OUTPATIENT)
Age: 72
End: 2022-07-26

## 2022-07-26 LAB
ALBUMIN SERPL ELPH-MCNC: 3.6 G/DL — SIGNIFICANT CHANGE UP (ref 3.3–5)
ALP SERPL-CCNC: 88 U/L — SIGNIFICANT CHANGE UP (ref 40–120)
ALT FLD-CCNC: 20 U/L — SIGNIFICANT CHANGE UP (ref 10–45)
ANION GAP SERPL CALC-SCNC: 8 MMOL/L — SIGNIFICANT CHANGE UP (ref 5–17)
APTT BLD: 28.9 SEC — SIGNIFICANT CHANGE UP (ref 27.5–35.5)
AST SERPL-CCNC: 21 U/L — SIGNIFICANT CHANGE UP (ref 10–40)
BILIRUB SERPL-MCNC: 0.4 MG/DL — SIGNIFICANT CHANGE UP (ref 0.2–1.2)
BLD GP AB SCN SERPL QL: NEGATIVE — SIGNIFICANT CHANGE UP
BUN SERPL-MCNC: 23 MG/DL — SIGNIFICANT CHANGE UP (ref 7–23)
CALCIUM SERPL-MCNC: 8.9 MG/DL — SIGNIFICANT CHANGE UP (ref 8.4–10.5)
CHLORIDE SERPL-SCNC: 102 MMOL/L — SIGNIFICANT CHANGE UP (ref 96–108)
CO2 SERPL-SCNC: 28 MMOL/L — SIGNIFICANT CHANGE UP (ref 22–31)
CREAT SERPL-MCNC: 1.25 MG/DL — SIGNIFICANT CHANGE UP (ref 0.5–1.3)
EGFR: 62 ML/MIN/1.73M2 — SIGNIFICANT CHANGE UP
GLUCOSE SERPL-MCNC: 87 MG/DL — SIGNIFICANT CHANGE UP (ref 70–99)
HCT VFR BLD CALC: 31.9 % — LOW (ref 39–50)
HGB BLD-MCNC: 10.2 G/DL — LOW (ref 13–17)
INR BLD: 1.06 RATIO — SIGNIFICANT CHANGE UP (ref 0.88–1.16)
MAGNESIUM SERPL-MCNC: 2.1 MG/DL — SIGNIFICANT CHANGE UP (ref 1.6–2.6)
MCHC RBC-ENTMCNC: 30 PG — SIGNIFICANT CHANGE UP (ref 27–34)
MCHC RBC-ENTMCNC: 32 GM/DL — SIGNIFICANT CHANGE UP (ref 32–36)
MCV RBC AUTO: 93.8 FL — SIGNIFICANT CHANGE UP (ref 80–100)
NRBC # BLD: 0 /100 WBCS — SIGNIFICANT CHANGE UP (ref 0–0)
PHOSPHATE SERPL-MCNC: 3 MG/DL — SIGNIFICANT CHANGE UP (ref 2.5–4.5)
PLATELET # BLD AUTO: 465 K/UL — HIGH (ref 150–400)
POTASSIUM SERPL-MCNC: 4.2 MMOL/L — SIGNIFICANT CHANGE UP (ref 3.5–5.3)
POTASSIUM SERPL-SCNC: 4.2 MMOL/L — SIGNIFICANT CHANGE UP (ref 3.5–5.3)
PROT SERPL-MCNC: 6.7 G/DL — SIGNIFICANT CHANGE UP (ref 6–8.3)
PROTHROM AB SERPL-ACNC: 12.3 SEC — SIGNIFICANT CHANGE UP (ref 10.5–13.4)
RBC # BLD: 3.4 M/UL — LOW (ref 4.2–5.8)
RBC # FLD: 13.9 % — SIGNIFICANT CHANGE UP (ref 10.3–14.5)
RH IG SCN BLD-IMP: POSITIVE — SIGNIFICANT CHANGE UP
SARS-COV-2 RNA SPEC QL NAA+PROBE: SIGNIFICANT CHANGE UP
SODIUM SERPL-SCNC: 138 MMOL/L — SIGNIFICANT CHANGE UP (ref 135–145)
WBC # BLD: 8.3 K/UL — SIGNIFICANT CHANGE UP (ref 3.8–10.5)
WBC # FLD AUTO: 8.3 K/UL — SIGNIFICANT CHANGE UP (ref 3.8–10.5)

## 2022-07-26 PROCEDURE — 99024 POSTOP FOLLOW-UP VISIT: CPT

## 2022-07-26 PROCEDURE — 99233 SBSQ HOSP IP/OBS HIGH 50: CPT

## 2022-07-26 RX ADMIN — OXYCODONE HYDROCHLORIDE 5 MILLIGRAM(S): 5 TABLET ORAL at 13:27

## 2022-07-26 RX ADMIN — AMPICILLIN SODIUM AND SULBACTAM SODIUM 100 GRAM(S): 250; 125 INJECTION, POWDER, FOR SUSPENSION INTRAMUSCULAR; INTRAVENOUS at 11:28

## 2022-07-26 RX ADMIN — AMPICILLIN SODIUM AND SULBACTAM SODIUM 100 GRAM(S): 250; 125 INJECTION, POWDER, FOR SUSPENSION INTRAMUSCULAR; INTRAVENOUS at 17:09

## 2022-07-26 RX ADMIN — AMPICILLIN SODIUM AND SULBACTAM SODIUM 100 GRAM(S): 250; 125 INJECTION, POWDER, FOR SUSPENSION INTRAMUSCULAR; INTRAVENOUS at 00:03

## 2022-07-26 RX ADMIN — OXYCODONE HYDROCHLORIDE 10 MILLIGRAM(S): 5 TABLET ORAL at 00:03

## 2022-07-26 RX ADMIN — OXYCODONE HYDROCHLORIDE 5 MILLIGRAM(S): 5 TABLET ORAL at 12:57

## 2022-07-26 RX ADMIN — OXYCODONE HYDROCHLORIDE 5 MILLIGRAM(S): 5 TABLET ORAL at 06:50

## 2022-07-26 RX ADMIN — OXYCODONE HYDROCHLORIDE 10 MILLIGRAM(S): 5 TABLET ORAL at 21:35

## 2022-07-26 RX ADMIN — AMPICILLIN SODIUM AND SULBACTAM SODIUM 100 GRAM(S): 250; 125 INJECTION, POWDER, FOR SUSPENSION INTRAMUSCULAR; INTRAVENOUS at 06:05

## 2022-07-26 RX ADMIN — OXYCODONE HYDROCHLORIDE 5 MILLIGRAM(S): 5 TABLET ORAL at 06:04

## 2022-07-26 RX ADMIN — SENNA PLUS 2 TABLET(S): 8.6 TABLET ORAL at 21:36

## 2022-07-26 RX ADMIN — AMLODIPINE BESYLATE 10 MILLIGRAM(S): 2.5 TABLET ORAL at 06:04

## 2022-07-26 RX ADMIN — SODIUM CHLORIDE 80 MILLILITER(S): 9 INJECTION, SOLUTION INTRAVENOUS at 00:03

## 2022-07-26 RX ADMIN — OXYCODONE HYDROCHLORIDE 10 MILLIGRAM(S): 5 TABLET ORAL at 01:00

## 2022-07-26 RX ADMIN — OXYCODONE HYDROCHLORIDE 10 MILLIGRAM(S): 5 TABLET ORAL at 22:20

## 2022-07-26 RX ADMIN — ENOXAPARIN SODIUM 40 MILLIGRAM(S): 100 INJECTION SUBCUTANEOUS at 11:28

## 2022-07-26 RX ADMIN — VALSARTAN 320 MILLIGRAM(S): 80 TABLET ORAL at 06:04

## 2022-07-26 NOTE — PROGRESS NOTE ADULT - SUBJECTIVE AND OBJECTIVE BOX
TRAUMA SURGERY DAILY PROGRESS NOTE:     SUBJECTIVE/ROS: Patient seen and examined. He feels well, denies nausea, vomiting, chest pain, shortness of breath. NPO for OR today with plastics.      MEDICATIONS  (STANDING):  amLODIPine   Tablet 10 milliGRAM(s) Oral daily  ampicillin/sulbactam  IVPB 1.5 Gram(s) IV Intermittent every 6 hours  enoxaparin Injectable 40 milliGRAM(s) SubCutaneous every 24 hours  lactated ringers. 1000 milliLiter(s) (80 mL/Hr) IV Continuous <Continuous>  polyethylene glycol 3350 17 Gram(s) Oral two times a day  senna 2 Tablet(s) Oral at bedtime  valsartan 320 milliGRAM(s) Oral daily    MEDICATIONS  (PRN):  acetaminophen     Tablet .. 650 milliGRAM(s) Oral every 6 hours PRN Mild Pain (1 - 3)  oxyCODONE    IR 5 milliGRAM(s) Oral every 6 hours PRN Moderate Pain (4 - 6)  oxyCODONE    IR 10 milliGRAM(s) Oral every 6 hours PRN Severe Pain (7 - 10)      OBJECTIVE:    Vital Signs Last 24 Hrs  T(C): 36.9 (26 Jul 2022 05:04), Max: 37.1 (25 Jul 2022 22:18)  T(F): 98.4 (26 Jul 2022 05:04), Max: 98.7 (25 Jul 2022 22:18)  HR: 67 (26 Jul 2022 05:04) (67 - 87)  BP: 151/81 (26 Jul 2022 05:04) (118/70 - 156/82)  BP(mean): --  RR: 18 (26 Jul 2022 05:04) (18 - 18)  SpO2: 92% (26 Jul 2022 05:04) (92% - 99%)    Parameters below as of 26 Jul 2022 05:04  Patient On (Oxygen Delivery Method): room air            I&O's Detail    25 Jul 2022 07:01  -  26 Jul 2022 07:00  --------------------------------------------------------  IN:    Lactated Ringers: 560 mL    Oral Fluid: 890 mL  Total IN: 1450 mL    OUT:  Total OUT: 0 mL    Total NET: 1450 mL          Daily     Daily     LABS:                        10.2   8.45  )-----------( 417      ( 25 Jul 2022 06:05 )             31.1     07-25    140  |  104  |  25<H>  ----------------------------<  106<H>  4.2   |  26  |  1.30    Ca    9.0      25 Jul 2022 06:05  Phos  3.1     07-25  Mg     2.1     07-25    TPro  6.5  /  Alb  3.5  /  TBili  0.3  /  DBili  x   /  AST  18  /  ALT  23  /  AlkPhos  98  07-25                  PHYSICAL EXAM:    General: Appears well, NAD  Neuro: AAOx3  CHEST: Clear to auscultation bilaterally  CV: Regular rate and rhythm  Abdomen: soft, nontender, nondistended, no rebound or guarding  Extremities: Grossly symmetric, RUE wrapped.

## 2022-07-26 NOTE — PROGRESS NOTE ADULT - SUBJECTIVE AND OBJECTIVE BOX
Patient is a 71y old  Male who presents with a chief complaint of Trauma radial artery injury (26 Jul 2022 07:19)      SUBJECTIVE / OVERNIGHT EVENTS: feels ok, denies cp, sob, abd pain     MEDICATIONS  (STANDING):  amLODIPine   Tablet 10 milliGRAM(s) Oral daily  ampicillin/sulbactam  IVPB 1.5 Gram(s) IV Intermittent every 6 hours  enoxaparin Injectable 40 milliGRAM(s) SubCutaneous every 24 hours  lactated ringers. 1000 milliLiter(s) (80 mL/Hr) IV Continuous <Continuous>  polyethylene glycol 3350 17 Gram(s) Oral two times a day  senna 2 Tablet(s) Oral at bedtime  valsartan 320 milliGRAM(s) Oral daily    MEDICATIONS  (PRN):  acetaminophen     Tablet .. 650 milliGRAM(s) Oral every 6 hours PRN Mild Pain (1 - 3)  oxyCODONE    IR 5 milliGRAM(s) Oral every 6 hours PRN Moderate Pain (4 - 6)  oxyCODONE    IR 10 milliGRAM(s) Oral every 6 hours PRN Severe Pain (7 - 10)        CAPILLARY BLOOD GLUCOSE        I&O's Summary    25 Jul 2022 07:01  -  26 Jul 2022 07:00  --------------------------------------------------------  IN: 1450 mL / OUT: 0 mL / NET: 1450 mL    26 Jul 2022 07:01  -  26 Jul 2022 12:05  --------------------------------------------------------  IN: 0 mL / OUT: 0 mL / NET: 0 mL        PHYSICAL EXAM:  GENERAL: NAD, well-developed  HEAD:  Atraumatic, Normocephalic  EYES: conjunctiva and sclera clear  NECK: Supple, No JVD  CHEST/LUNG: Clear to auscultation bilaterally; No wheeze  HEART: Regular rate and rhythm; No murmurs, rubs, or gallops  ABDOMEN: Soft, Nontender, Nondistended; Bowel sounds present  EXTREMITIES:  RUE wrapped in ace wrap   PSYCH: AAOx3      LABS:                        10.2   8.30  )-----------( 465      ( 26 Jul 2022 07:28 )             31.9     07-26    138  |  102  |  23  ----------------------------<  87  4.2   |  28  |  1.25    Ca    8.9      26 Jul 2022 07:28  Phos  3.0     07-26  Mg     2.1     07-26    TPro  6.7  /  Alb  3.6  /  TBili  0.4  /  DBili  x   /  AST  21  /  ALT  20  /  AlkPhos  88  07-26    PT/INR - ( 26 Jul 2022 07:28 )   PT: 12.3 sec;   INR: 1.06 ratio         PTT - ( 26 Jul 2022 07:28 )  PTT:28.9 sec          RADIOLOGY & ADDITIONAL TESTS:    Imaging Personally Reviewed:    Consultant(s) Notes Reviewed:      Care Discussed with Consultants/Other Providers:

## 2022-07-26 NOTE — PROGRESS NOTE ADULT - ASSESSMENT
71M with PMHx of HTN, transferred from OSH after fall with trauma to right forearm, open laceration, s/p  vascular procedure unknown at OSH. Now s/p wound I&D 7/13.    PLAN:  - Plastics following for possible split thickness graft to Right Forearm wound  - Plan for OR in conjunction with plastics today  - (-) UE Duplex  - Cont two times a day wound care per nursing with xeroform and hydrogel  - Activity- OOB with assistance  - Pain medication as needed   - DVT ppx  - incentive spirometry    ACS  [id number]

## 2022-07-27 LAB
ANION GAP SERPL CALC-SCNC: 8 MMOL/L — SIGNIFICANT CHANGE UP (ref 5–17)
APTT BLD: 27.8 SEC — SIGNIFICANT CHANGE UP (ref 27.5–35.5)
BUN SERPL-MCNC: 18 MG/DL — SIGNIFICANT CHANGE UP (ref 7–23)
CALCIUM SERPL-MCNC: 9.1 MG/DL — SIGNIFICANT CHANGE UP (ref 8.4–10.5)
CHLORIDE SERPL-SCNC: 103 MMOL/L — SIGNIFICANT CHANGE UP (ref 96–108)
CO2 SERPL-SCNC: 28 MMOL/L — SIGNIFICANT CHANGE UP (ref 22–31)
CREAT SERPL-MCNC: 1.06 MG/DL — SIGNIFICANT CHANGE UP (ref 0.5–1.3)
EGFR: 76 ML/MIN/1.73M2 — SIGNIFICANT CHANGE UP
GLUCOSE SERPL-MCNC: 111 MG/DL — HIGH (ref 70–99)
HCT VFR BLD CALC: 33.3 % — LOW (ref 39–50)
HGB BLD-MCNC: 10.7 G/DL — LOW (ref 13–17)
INR BLD: 1.1 RATIO — SIGNIFICANT CHANGE UP (ref 0.88–1.16)
MAGNESIUM SERPL-MCNC: 2 MG/DL — SIGNIFICANT CHANGE UP (ref 1.6–2.6)
MCHC RBC-ENTMCNC: 30.4 PG — SIGNIFICANT CHANGE UP (ref 27–34)
MCHC RBC-ENTMCNC: 32.1 GM/DL — SIGNIFICANT CHANGE UP (ref 32–36)
MCV RBC AUTO: 94.6 FL — SIGNIFICANT CHANGE UP (ref 80–100)
NRBC # BLD: 0 /100 WBCS — SIGNIFICANT CHANGE UP (ref 0–0)
PHOSPHATE SERPL-MCNC: 2.7 MG/DL — SIGNIFICANT CHANGE UP (ref 2.5–4.5)
PLATELET # BLD AUTO: 451 K/UL — HIGH (ref 150–400)
POTASSIUM SERPL-MCNC: 4.5 MMOL/L — SIGNIFICANT CHANGE UP (ref 3.5–5.3)
POTASSIUM SERPL-SCNC: 4.5 MMOL/L — SIGNIFICANT CHANGE UP (ref 3.5–5.3)
PROTHROM AB SERPL-ACNC: 12.8 SEC — SIGNIFICANT CHANGE UP (ref 10.5–13.4)
RBC # BLD: 3.52 M/UL — LOW (ref 4.2–5.8)
RBC # FLD: 13.9 % — SIGNIFICANT CHANGE UP (ref 10.3–14.5)
SODIUM SERPL-SCNC: 139 MMOL/L — SIGNIFICANT CHANGE UP (ref 135–145)
WBC # BLD: 6.93 K/UL — SIGNIFICANT CHANGE UP (ref 3.8–10.5)
WBC # FLD AUTO: 6.93 K/UL — SIGNIFICANT CHANGE UP (ref 3.8–10.5)

## 2022-07-27 PROCEDURE — 99233 SBSQ HOSP IP/OBS HIGH 50: CPT

## 2022-07-27 PROCEDURE — 97606 NEG PRS WND THER DME>50 SQCM: CPT | Mod: GC

## 2022-07-27 PROCEDURE — 11046 DBRDMT MUSC&/FSCA EA ADDL: CPT | Mod: GC

## 2022-07-27 PROCEDURE — 11043 DBRDMT MUSC&/FSCA 1ST 20/<: CPT | Mod: GC

## 2022-07-27 RX ORDER — HYDROMORPHONE HYDROCHLORIDE 2 MG/ML
0.5 INJECTION INTRAMUSCULAR; INTRAVENOUS; SUBCUTANEOUS
Refills: 0 | Status: DISCONTINUED | OUTPATIENT
Start: 2022-07-27 | End: 2022-07-27

## 2022-07-27 RX ORDER — ONDANSETRON 8 MG/1
4 TABLET, FILM COATED ORAL ONCE
Refills: 0 | Status: DISCONTINUED | OUTPATIENT
Start: 2022-07-27 | End: 2022-07-27

## 2022-07-27 RX ORDER — AMLODIPINE BESYLATE 2.5 MG/1
10 TABLET ORAL DAILY
Refills: 0 | Status: DISCONTINUED | OUTPATIENT
Start: 2022-07-27 | End: 2022-07-29

## 2022-07-27 RX ORDER — POLYETHYLENE GLYCOL 3350 17 G/17G
17 POWDER, FOR SOLUTION ORAL
Refills: 0 | Status: DISCONTINUED | OUTPATIENT
Start: 2022-07-27 | End: 2022-07-29

## 2022-07-27 RX ORDER — OXYCODONE HYDROCHLORIDE 5 MG/1
10 TABLET ORAL EVERY 6 HOURS
Refills: 0 | Status: DISCONTINUED | OUTPATIENT
Start: 2022-07-27 | End: 2022-07-29

## 2022-07-27 RX ORDER — ACETAMINOPHEN 500 MG
650 TABLET ORAL EVERY 6 HOURS
Refills: 0 | Status: DISCONTINUED | OUTPATIENT
Start: 2022-07-27 | End: 2022-07-29

## 2022-07-27 RX ORDER — VALSARTAN 80 MG/1
320 TABLET ORAL DAILY
Refills: 0 | Status: DISCONTINUED | OUTPATIENT
Start: 2022-07-27 | End: 2022-07-29

## 2022-07-27 RX ORDER — SENNA PLUS 8.6 MG/1
2 TABLET ORAL AT BEDTIME
Refills: 0 | Status: DISCONTINUED | OUTPATIENT
Start: 2022-07-27 | End: 2022-07-29

## 2022-07-27 RX ORDER — BENZOCAINE AND MENTHOL 5; 1 G/100ML; G/100ML
1 LIQUID ORAL ONCE
Refills: 0 | Status: COMPLETED | OUTPATIENT
Start: 2022-07-27 | End: 2022-07-27

## 2022-07-27 RX ORDER — AMPICILLIN SODIUM AND SULBACTAM SODIUM 250; 125 MG/ML; MG/ML
1.5 INJECTION, POWDER, FOR SUSPENSION INTRAMUSCULAR; INTRAVENOUS EVERY 6 HOURS
Refills: 0 | Status: DISCONTINUED | OUTPATIENT
Start: 2022-07-27 | End: 2022-07-28

## 2022-07-27 RX ORDER — ENOXAPARIN SODIUM 100 MG/ML
40 INJECTION SUBCUTANEOUS EVERY 24 HOURS
Refills: 0 | Status: DISCONTINUED | OUTPATIENT
Start: 2022-07-27 | End: 2022-07-29

## 2022-07-27 RX ORDER — OXYCODONE HYDROCHLORIDE 5 MG/1
5 TABLET ORAL EVERY 6 HOURS
Refills: 0 | Status: DISCONTINUED | OUTPATIENT
Start: 2022-07-27 | End: 2022-07-29

## 2022-07-27 RX ADMIN — OXYCODONE HYDROCHLORIDE 5 MILLIGRAM(S): 5 TABLET ORAL at 22:45

## 2022-07-27 RX ADMIN — BENZOCAINE AND MENTHOL 1 LOZENGE: 5; 1 LIQUID ORAL at 16:27

## 2022-07-27 RX ADMIN — VALSARTAN 320 MILLIGRAM(S): 80 TABLET ORAL at 06:00

## 2022-07-27 RX ADMIN — SENNA PLUS 2 TABLET(S): 8.6 TABLET ORAL at 21:56

## 2022-07-27 RX ADMIN — AMPICILLIN SODIUM AND SULBACTAM SODIUM 100 GRAM(S): 250; 125 INJECTION, POWDER, FOR SUSPENSION INTRAMUSCULAR; INTRAVENOUS at 05:59

## 2022-07-27 RX ADMIN — AMPICILLIN SODIUM AND SULBACTAM SODIUM 100 GRAM(S): 250; 125 INJECTION, POWDER, FOR SUSPENSION INTRAMUSCULAR; INTRAVENOUS at 12:31

## 2022-07-27 RX ADMIN — AMPICILLIN SODIUM AND SULBACTAM SODIUM 100 GRAM(S): 250; 125 INJECTION, POWDER, FOR SUSPENSION INTRAMUSCULAR; INTRAVENOUS at 00:07

## 2022-07-27 RX ADMIN — OXYCODONE HYDROCHLORIDE 5 MILLIGRAM(S): 5 TABLET ORAL at 10:30

## 2022-07-27 RX ADMIN — OXYCODONE HYDROCHLORIDE 5 MILLIGRAM(S): 5 TABLET ORAL at 21:56

## 2022-07-27 RX ADMIN — SODIUM CHLORIDE 80 MILLILITER(S): 9 INJECTION, SOLUTION INTRAVENOUS at 08:14

## 2022-07-27 RX ADMIN — OXYCODONE HYDROCHLORIDE 5 MILLIGRAM(S): 5 TABLET ORAL at 09:44

## 2022-07-27 RX ADMIN — ENOXAPARIN SODIUM 40 MILLIGRAM(S): 100 INJECTION SUBCUTANEOUS at 12:31

## 2022-07-27 RX ADMIN — AMPICILLIN SODIUM AND SULBACTAM SODIUM 100 GRAM(S): 250; 125 INJECTION, POWDER, FOR SUSPENSION INTRAMUSCULAR; INTRAVENOUS at 18:45

## 2022-07-27 RX ADMIN — AMLODIPINE BESYLATE 10 MILLIGRAM(S): 2.5 TABLET ORAL at 06:00

## 2022-07-27 NOTE — DIETITIAN INITIAL EVALUATION ADULT - ADD RECOMMEND
1) As medically feasible, recommend resuming PO, Regular diet, adjust PRN  2) Add multivitamin and vitamin C if no medical contraindications to aid in wound healing  3) Monitor PO intake, weight, labs, skin, GI status, diet

## 2022-07-27 NOTE — CHART NOTE - NSCHARTNOTEFT_GEN_A_CORE
Surgery Post-Op Note    Pre-Op Dx: R forearm open laceration  Procedure: Washout of wound of arm    Debridement, muscle      Surgeon: Solange Levy    Subjective:   Pt seen and examined at the bedside. Pt w/ no complaints. Denies F/C/N/V. Pain controlled with medication.     Vital Signs Last 24 Hrs  T(C): 37.3 (27 Jul 2022 18:35), Max: 37.3 (27 Jul 2022 18:35)  T(F): 99.1 (27 Jul 2022 18:35), Max: 99.1 (27 Jul 2022 18:35)  HR: 87 (27 Jul 2022 18:35) (58 - 87)  BP: 132/73 (27 Jul 2022 18:35) (115/63 - 139/77)  BP(mean): 93 (27 Jul 2022 18:00) (82 - 102)  RR: 18 (27 Jul 2022 18:35) (12 - 18)  SpO2: 96% (27 Jul 2022 18:35) (92% - 100%)    Parameters below as of 27 Jul 2022 18:35  Patient On (Oxygen Delivery Method): room air        Physical Exam:  General: NAD, resting comfortably in bed  Neuro: A/O x 3,  Pulmonary: Nonlabored breathing, no respiratory distress  Cardiovascular: NSR  Abdominal: soft, ATTP. ND  Incision: C/D/I   Drains:   Extremities: WWP  RUE woudn vac in place  SILT ulnar, radial  Strong radial pulse  Non-palp ulnar pulse, consistent w previous exams        LABS:                        10.7   6.93  )-----------( 451      ( 27 Jul 2022 11:06 )             33.3     07-27    139  |  103  |  18  ----------------------------<  111<H>  4.5   |  28  |  1.06    Ca    9.1      27 Jul 2022 11:06  Phos  2.7     07-27  Mg     2.0     07-27    TPro  6.7  /  Alb  3.6  /  TBili  0.4  /  DBili  x   /  AST  21  /  ALT  20  /  AlkPhos  88  07-26    PT/INR - ( 27 Jul 2022 11:06 )   PT: 12.8 sec;   INR: 1.10 ratio         PTT - ( 27 Jul 2022 11:06 )  PTT:27.8 sec  CAPILLARY BLOOD GLUCOSE          LIVER FUNCTIONS - ( 26 Jul 2022 07:28 )  Alb: 3.6 g/dL / Pro: 6.7 g/dL / ALK PHOS: 88 U/L / ALT: 20 U/L / AST: 21 U/L / GGT: x                 Radiology and Additional Studies:    Assessment:71y Male s/p above procedure    Plan:  IVF, Diet:  Pain/nausea control PRN  Incentive spirometer/OOB/Ambulate Surgery Post-Op Note    Pre-Op Dx: R forearm open laceration  Procedure: Washout of wound of arm    Debridement, muscle      Surgeon: Solange Levy    Subjective:   Pt seen and examined at the bedside. Pt w/ no complaints. Denies F/C/N/V. Pain controlled with medication.       Vital Signs Last 24 Hrs  T(C): 37.3 (27 Jul 2022 18:35), Max: 37.3 (27 Jul 2022 18:35)  T(F): 99.1 (27 Jul 2022 18:35), Max: 99.1 (27 Jul 2022 18:35)  HR: 87 (27 Jul 2022 18:35) (58 - 87)  BP: 132/73 (27 Jul 2022 18:35) (115/63 - 139/77)  BP(mean): 93 (27 Jul 2022 18:00) (82 - 102)  RR: 18 (27 Jul 2022 18:35) (12 - 18)  SpO2: 96% (27 Jul 2022 18:35) (92% - 100%)    Parameters below as of 27 Jul 2022 18:35  Patient On (Oxygen Delivery Method): room air        Physical Exam:  General: NAD, resting comfortably in bed  Neuro: A/O x 3,  Pulmonary: Nonlabored breathing, no respiratory distress  Cardiovascular: NSR  Abdominal: soft, ATTP. ND  Incision: C/D/I   Drains:   Extremities: WWP, good cap refill  RUE woudn vac in place  SILT ulnar, radial  Strong radial pulse  Non-palp ulnar pulse, consistent w previous exams  Motor intact radialy, ulnar strength 4/5        LABS:                        10.7   6.93  )-----------( 451      ( 27 Jul 2022 11:06 )             33.3     07-27    139  |  103  |  18  ----------------------------<  111<H>  4.5   |  28  |  1.06    Ca    9.1      27 Jul 2022 11:06  Phos  2.7     07-27  Mg     2.0     07-27    TPro  6.7  /  Alb  3.6  /  TBili  0.4  /  DBili  x   /  AST  21  /  ALT  20  /  AlkPhos  88  07-26    PT/INR - ( 27 Jul 2022 11:06 )   PT: 12.8 sec;   INR: 1.10 ratio         PTT - ( 27 Jul 2022 11:06 )  PTT:27.8 sec  CAPILLARY BLOOD GLUCOSE          LIVER FUNCTIONS - ( 26 Jul 2022 07:28 )  Alb: 3.6 g/dL / Pro: 6.7 g/dL / ALK PHOS: 88 U/L / ALT: 20 U/L / AST: 21 U/L / GGT: x                 Radiology and Additional Studies:    Assessment:71y Male s/p above procedure    Plan:   IVF, Diet: Reg diet   Pain/nausea control PRN  Incentive spirometer/OOB/Ambulate        ACS, Trauma Team  Pager [phone]

## 2022-07-27 NOTE — DIETITIAN INITIAL EVALUATION ADULT - REASON
Deferred at this time as patient eating well and with reported weight stability  No overt signs of muscle or fat wasting observed

## 2022-07-27 NOTE — PROGRESS NOTE ADULT - SUBJECTIVE AND OBJECTIVE BOX
Patient is a 71y old  Male who presents with a chief complaint of Trauma radial artery injury (27 Jul 2022 06:05)      SUBJECTIVE / OVERNIGHT EVENTS: pt feels ok, denies cp, sob, pain in arm is ok, had bm     MEDICATIONS  (STANDING):  amLODIPine   Tablet 10 milliGRAM(s) Oral daily  ampicillin/sulbactam  IVPB 1.5 Gram(s) IV Intermittent every 6 hours  enoxaparin Injectable 40 milliGRAM(s) SubCutaneous every 24 hours  lactated ringers. 1000 milliLiter(s) (80 mL/Hr) IV Continuous <Continuous>  polyethylene glycol 3350 17 Gram(s) Oral two times a day  senna 2 Tablet(s) Oral at bedtime  valsartan 320 milliGRAM(s) Oral daily    MEDICATIONS  (PRN):  acetaminophen     Tablet .. 650 milliGRAM(s) Oral every 6 hours PRN Mild Pain (1 - 3)  oxyCODONE    IR 5 milliGRAM(s) Oral every 6 hours PRN Moderate Pain (4 - 6)  oxyCODONE    IR 10 milliGRAM(s) Oral every 6 hours PRN Severe Pain (7 - 10)        CAPILLARY BLOOD GLUCOSE        I&O's Summary    26 Jul 2022 07:01  -  27 Jul 2022 07:00  --------------------------------------------------------  IN: 1820 mL / OUT: 0 mL / NET: 1820 mL        PHYSICAL EXAM:  GENERAL: NAD, well-developed  HEAD:  Atraumatic, Normocephalic  EYES: conjunctiva and sclera clear  NECK: Supple, No JVD  CHEST/LUNG: Clear to auscultation bilaterally; No wheeze  HEART: Regular rate and rhythm; No murmurs, rubs, or gallops  ABDOMEN: Soft, Nontender, Nondistended; Bowel sounds present  EXTREMITIES:  RUE wrapped in ace wrap   PSYCH: AAOx3      LABS:                        10.7   6.93  )-----------( 451      ( 27 Jul 2022 11:06 )             33.3     07-27    139  |  103  |  18  ----------------------------<  111<H>  4.5   |  28  |  1.06    Ca    9.1      27 Jul 2022 11:06  Phos  2.7     07-27  Mg     2.0     07-27    TPro  6.7  /  Alb  3.6  /  TBili  0.4  /  DBili  x   /  AST  21  /  ALT  20  /  AlkPhos  88  07-26    PT/INR - ( 27 Jul 2022 11:06 )   PT: 12.8 sec;   INR: 1.10 ratio         PTT - ( 27 Jul 2022 11:06 )  PTT:27.8 sec          RADIOLOGY & ADDITIONAL TESTS:    Imaging Personally Reviewed:    Consultant(s) Notes Reviewed:      Care Discussed with Consultants/Other Providers:

## 2022-07-27 NOTE — BRIEF OPERATIVE NOTE - OPERATION/FINDINGS
R UE muscle debridment, wound vac
Right forearm wound exploration and washout  - Distorted anatomy noted with exposed muscle and questionable viability of some muscle groups  - Strong pulse alone radial artery noted  - Ulnar artery appeared to be primarily repaired with end-end anastomosis but no flow palpated  - Muscle approximated over major blood vessels  - Wound dressed with xeroform, gauze, kerlix and ACE wrap

## 2022-07-27 NOTE — PROGRESS NOTE ADULT - PROBLEM SELECTOR PROBLEM 5
Prophylactic measure
Preoperative examination
Prophylactic measure
Prophylactic measure
Preoperative examination
Preoperative examination

## 2022-07-27 NOTE — BRIEF OPERATIVE NOTE - NSICDXBRIEFPROCEDURE_GEN_ALL_CORE_FT
PROCEDURES:  Debridement, muscle 27-Jul-2022 15:49:01  Florencio Austin  
PROCEDURES:  Washout of wound of arm 13-Jul-2022 19:14:37  Delgado Cole

## 2022-07-27 NOTE — DIETITIAN INITIAL EVALUATION ADULT - REASON FOR ADMISSION
Pt is a 70 yo male with PMH of HTN, knee pain, benign parotid mass, and history of squamous cell carcinoma (removed from scalp 2016) who presented as transfer from OSH s/p unclear procedure after a fall. Admitted 7/13 with R arm traumatic wound s/p fasciotomy and wound washout. Transferred from SICU 7/15.

## 2022-07-27 NOTE — PROGRESS NOTE ADULT - SUBJECTIVE AND OBJECTIVE BOX
GENERAL SURGERY PROGRESS NOTE    SUBJECTIVE    OVERNIGHT EVENTS: Patient seen and examined. He feels well, denies nausea, vomiting, chest pain, shortness of breath. NPO for OR today with plastics.     10-point review of systems completed and negative except as noted above.      OBJECTIVE    MEDICATIONS  acetaminophen     Tablet .. 650 milliGRAM(s) Oral every 6 hours PRN  amLODIPine   Tablet 10 milliGRAM(s) Oral daily  ampicillin/sulbactam  IVPB 1.5 Gram(s) IV Intermittent every 6 hours  enoxaparin Injectable 40 milliGRAM(s) SubCutaneous every 24 hours  lactated ringers. 1000 milliLiter(s) IV Continuous <Continuous>  oxyCODONE    IR 5 milliGRAM(s) Oral every 6 hours PRN  oxyCODONE    IR 10 milliGRAM(s) Oral every 6 hours PRN  polyethylene glycol 3350 17 Gram(s) Oral two times a day  senna 2 Tablet(s) Oral at bedtime  valsartan 320 milliGRAM(s) Oral daily      PHYSICAL EXAM  T(C): 36.8 (07-27-22 @ 05:02), Max: 37.1 (07-26-22 @ 09:06)  HR: 58 (07-27-22 @ 05:02) (58 - 80)  BP: 116/62 (07-27-22 @ 05:02) (116/62 - 138/66)  RR: 18 (07-27-22 @ 05:02) (18 - 18)  SpO2: 92% (07-27-22 @ 05:02) (92% - 94%)    07-25-22 @ 07:01  -  07-26-22 @ 07:00  --------------------------------------------------------  IN: 1450 mL / OUT: 0 mL / NET: 1450 mL    07-26-22 @ 07:01  -  07-27-22 @ 06:06  --------------------------------------------------------  IN: 1600 mL / OUT: 0 mL / NET: 1600 mL        General: Appears well, NAD  Neuro: AAOx3  CHEST: Clear to auscultation bilaterally  CV: Regular rate and rhythm  Abdomen: soft, nontender, nondistended, no rebound or guarding  Extremities:  Grossly symmetric, RUE wrapped.     LABS                        10.2   8.30  )-----------( 465      ( 26 Jul 2022 07:28 )             31.9     07-26    138  |  102  |  23  ----------------------------<  87  4.2   |  28  |  1.25    Ca    8.9      26 Jul 2022 07:28  Phos  3.0     07-26  Mg     2.1     07-26    TPro  6.7  /  Alb  3.6  /  TBili  0.4  /  DBili  x   /  AST  21  /  ALT  20  /  AlkPhos  88  07-26    PT/INR - ( 26 Jul 2022 07:28 )   PT: 12.3 sec;   INR: 1.06 ratio         PTT - ( 26 Jul 2022 07:28 )  PTT:28.9 sec      RADIOLOGY & ADDITIONAL STUDIES

## 2022-07-27 NOTE — DIETITIAN INITIAL EVALUATION ADULT - ORAL INTAKE PTA/DIET HISTORY
Pt reports good appetite and PO intake PTA with no recent changes. Denies following any dietary restrictions. Confirmed NKFA.

## 2022-07-27 NOTE — DIETITIAN INITIAL EVALUATION ADULT - PERTINENT LABORATORY DATA
07-27    139  |  103  |  18  ----------------------------<  111<H>  4.5   |  28  |  1.06    Ca    9.1      27 Jul 2022 11:06  Phos  2.7     07-27  Mg     2.0     07-27    TPro  6.7  /  Alb  3.6  /  TBili  0.4  /  DBili  x   /  AST  21  /  ALT  20  /  AlkPhos  88  07-26  A1C with Estimated Average Glucose Result: 5.4 % (07-13-22 @ 07:45)

## 2022-07-27 NOTE — PROGRESS NOTE ADULT - ASSESSMENT
71M with PMHx of HTN, transferred from OSH after fall with trauma to right forearm, open laceration, s/p  vascular procedure unknown at OSH. Now s/p wound I&D 7/13.    PLAN:  - OR today with Plastics. possible split thickness graft to Right Forearm wound  - Plan for OR in conjunction with plastics 7/27.  - (-) UE Duplex  - Cont two times a day wound care per nursing with xeroform and hydrogel  - Activity- OOB with assistance  - Pain medication as needed   - DVT ppx  - incentive spirometry

## 2022-07-27 NOTE — DIETITIAN INITIAL EVALUATION ADULT - NS FNS DIET ORDER
Diet, NPO after Midnight:      NPO Start Date: 26-Jul-2022,   NPO Start Time: 23:59  Except Medications (07-27-22 @ 09:44)

## 2022-07-27 NOTE — PROGRESS NOTE ADULT - PROBLEM SELECTOR PLAN 5
RCRI : 0   CLASS I risk of perioperative  cardiac events with a risk percentage of 3.9%    METS ~ 4 ( expected functional status given age)  Denies CP, SOB on exertion  EKG reviewed ; NSR , no ischemic changes  No absolute contraindications  Low risk procedure    Based on current ACC/AHA guidelines, pt history and physical exam , the pt is considered to have low risk from a cardiovascular standpoint for planned procedure.    No further testing needed prior to procedure.
DVT PPX: lovenox  No PT needs.
RCRI : 0   CLASS I risk of perioperative  cardiac events with a risk percentage of 3.9%    METS ~ 4 ( expected functional status given age)  Denies CP, SOB on exertion  EKG reviewed ; NSR , no ischemic changes  No absolute contraindications  Low risk procedure    Based on current ACC/AHA guidelines, pt history and physical exam , the pt is considered to have low risk from a cardiovascular standpoint for planned procedure.    No further testing needed prior to procedure.
DVT PPX: lovenox  No PT needs.
RCRI : 0   CLASS I risk of perioperative  cardiac events with a risk percentage of 3.9%    METS ~ 4 ( expected functional status given age)  Denies CP, SOB on exertion  EKG reviewed ; NSR , no ischemic changes  No absolute contraindications  Low risk procedure    Based on current ACC/AHA guidelines, pt history and physical exam , the pt is considered to have low risk from a cardiovascular standpoint for planned procedure.    No further testing needed prior to procedure.
DVT PPX: lovenox  No PT needs.

## 2022-07-27 NOTE — DIETITIAN INITIAL EVALUATION ADULT - OTHER INFO
Pt reports stable UBW of 190 pounds PTA. Dosing weight 195.9 pounds suggests weight stability/possible gain. Will continue to monitor and trend weights as able.

## 2022-07-27 NOTE — PRE-ANESTHESIA EVALUATION ADULT - NSANTHPMHFT_GEN_ALL_CORE
72 yo M PMH HTN presented as a fall from ladder adm w/ r arm traumatic wound s/p fasciotomy now for wound washout

## 2022-07-28 ENCOUNTER — TRANSCRIPTION ENCOUNTER (OUTPATIENT)
Age: 72
End: 2022-07-28

## 2022-07-28 LAB
ALBUMIN SERPL ELPH-MCNC: 3.5 G/DL — SIGNIFICANT CHANGE UP (ref 3.3–5)
ALP SERPL-CCNC: 90 U/L — SIGNIFICANT CHANGE UP (ref 40–120)
ALT FLD-CCNC: 21 U/L — SIGNIFICANT CHANGE UP (ref 10–45)
ANION GAP SERPL CALC-SCNC: 10 MMOL/L — SIGNIFICANT CHANGE UP (ref 5–17)
AST SERPL-CCNC: 17 U/L — SIGNIFICANT CHANGE UP (ref 10–40)
BILIRUB SERPL-MCNC: 0.2 MG/DL — SIGNIFICANT CHANGE UP (ref 0.2–1.2)
BUN SERPL-MCNC: 24 MG/DL — HIGH (ref 7–23)
CALCIUM SERPL-MCNC: 8.8 MG/DL — SIGNIFICANT CHANGE UP (ref 8.4–10.5)
CHLORIDE SERPL-SCNC: 103 MMOL/L — SIGNIFICANT CHANGE UP (ref 96–108)
CO2 SERPL-SCNC: 26 MMOL/L — SIGNIFICANT CHANGE UP (ref 22–31)
CREAT SERPL-MCNC: 1.16 MG/DL — SIGNIFICANT CHANGE UP (ref 0.5–1.3)
EGFR: 68 ML/MIN/1.73M2 — SIGNIFICANT CHANGE UP
GLUCOSE SERPL-MCNC: 138 MG/DL — HIGH (ref 70–99)
HCT VFR BLD CALC: 31.7 % — LOW (ref 39–50)
HGB BLD-MCNC: 10 G/DL — LOW (ref 13–17)
MAGNESIUM SERPL-MCNC: 2.3 MG/DL — SIGNIFICANT CHANGE UP (ref 1.6–2.6)
MCHC RBC-ENTMCNC: 29.8 PG — SIGNIFICANT CHANGE UP (ref 27–34)
MCHC RBC-ENTMCNC: 31.5 GM/DL — LOW (ref 32–36)
MCV RBC AUTO: 94.3 FL — SIGNIFICANT CHANGE UP (ref 80–100)
NRBC # BLD: 0 /100 WBCS — SIGNIFICANT CHANGE UP (ref 0–0)
PHOSPHATE SERPL-MCNC: 2.4 MG/DL — LOW (ref 2.5–4.5)
PLATELET # BLD AUTO: 468 K/UL — HIGH (ref 150–400)
POTASSIUM SERPL-MCNC: 4.1 MMOL/L — SIGNIFICANT CHANGE UP (ref 3.5–5.3)
POTASSIUM SERPL-SCNC: 4.1 MMOL/L — SIGNIFICANT CHANGE UP (ref 3.5–5.3)
PROT SERPL-MCNC: 6.5 G/DL — SIGNIFICANT CHANGE UP (ref 6–8.3)
RBC # BLD: 3.36 M/UL — LOW (ref 4.2–5.8)
RBC # FLD: 13.8 % — SIGNIFICANT CHANGE UP (ref 10.3–14.5)
SARS-COV-2 RNA SPEC QL NAA+PROBE: SIGNIFICANT CHANGE UP
SODIUM SERPL-SCNC: 139 MMOL/L — SIGNIFICANT CHANGE UP (ref 135–145)
WBC # BLD: 6.43 K/UL — SIGNIFICANT CHANGE UP (ref 3.8–10.5)
WBC # FLD AUTO: 6.43 K/UL — SIGNIFICANT CHANGE UP (ref 3.8–10.5)

## 2022-07-28 PROCEDURE — 99232 SBSQ HOSP IP/OBS MODERATE 35: CPT

## 2022-07-28 PROCEDURE — 99231 SBSQ HOSP IP/OBS SF/LOW 25: CPT

## 2022-07-28 RX ADMIN — AMPICILLIN SODIUM AND SULBACTAM SODIUM 100 GRAM(S): 250; 125 INJECTION, POWDER, FOR SUSPENSION INTRAMUSCULAR; INTRAVENOUS at 06:45

## 2022-07-28 RX ADMIN — POLYETHYLENE GLYCOL 3350 17 GRAM(S): 17 POWDER, FOR SOLUTION ORAL at 06:45

## 2022-07-28 RX ADMIN — OXYCODONE HYDROCHLORIDE 10 MILLIGRAM(S): 5 TABLET ORAL at 04:50

## 2022-07-28 RX ADMIN — VALSARTAN 320 MILLIGRAM(S): 80 TABLET ORAL at 06:45

## 2022-07-28 RX ADMIN — AMPICILLIN SODIUM AND SULBACTAM SODIUM 100 GRAM(S): 250; 125 INJECTION, POWDER, FOR SUSPENSION INTRAMUSCULAR; INTRAVENOUS at 01:59

## 2022-07-28 RX ADMIN — OXYCODONE HYDROCHLORIDE 10 MILLIGRAM(S): 5 TABLET ORAL at 04:00

## 2022-07-28 RX ADMIN — ENOXAPARIN SODIUM 40 MILLIGRAM(S): 100 INJECTION SUBCUTANEOUS at 14:05

## 2022-07-28 RX ADMIN — AMLODIPINE BESYLATE 10 MILLIGRAM(S): 2.5 TABLET ORAL at 06:45

## 2022-07-28 RX ADMIN — OXYCODONE HYDROCHLORIDE 10 MILLIGRAM(S): 5 TABLET ORAL at 22:30

## 2022-07-28 RX ADMIN — OXYCODONE HYDROCHLORIDE 10 MILLIGRAM(S): 5 TABLET ORAL at 14:04

## 2022-07-28 RX ADMIN — SENNA PLUS 2 TABLET(S): 8.6 TABLET ORAL at 21:43

## 2022-07-28 RX ADMIN — AMPICILLIN SODIUM AND SULBACTAM SODIUM 100 GRAM(S): 250; 125 INJECTION, POWDER, FOR SUSPENSION INTRAMUSCULAR; INTRAVENOUS at 13:57

## 2022-07-28 RX ADMIN — OXYCODONE HYDROCHLORIDE 10 MILLIGRAM(S): 5 TABLET ORAL at 21:43

## 2022-07-28 RX ADMIN — OXYCODONE HYDROCHLORIDE 10 MILLIGRAM(S): 5 TABLET ORAL at 14:35

## 2022-07-28 RX ADMIN — POLYETHYLENE GLYCOL 3350 17 GRAM(S): 17 POWDER, FOR SOLUTION ORAL at 17:24

## 2022-07-28 NOTE — PROGRESS NOTE ADULT - ASSESSMENT
71M with PMHx of HTN, transferred from OSH after fall with trauma to right forearm, open laceration, s/p  vascular procedure unknown at OSH. Now s/p wound I&D 7/13.    PLAN:  - POD1 S/P RUE wound debridement + wound vac placement 7/27  - Cont two times a day wound care per nursing with xeroform and hydrogel  - Activity- OOB with assistance  - Pain medication as needed   - DVT ppx  - incentive spirometry 71M with PMHx of HTN, transferred from OSH after fall with trauma to right forearm, open laceration, s/p  vascular procedure unknown at OSH. Now s/p wound I&D 7/13.    PLAN:  - POD1 S/P RUE wound debridement + wound vac placement 7/27  - Cont two times a day wound care per nursing with xeroform and hydrogel  - Activity- OOB with assistance  - Pain medication as needed   - PT today  - Anticipated Discharge 7/29  - DVT ppx  - incentive spirometry

## 2022-07-28 NOTE — DISCHARGE NOTE PROVIDER - HOSPITAL COURSE
71y Male presented as transfer from [facility] s/p fall resulting in R arm injury after landing on rebar. The fall was witnessed without head strike or loss of consciousness. At [facility] patient underwent ulnar artery interposition graft with autologous vein (7/12/22). He was transferred to [facility] with direct admission to the SICU while intubated and was taken for washout/debridement and exploration by trauma 7/13/22. Plastic surgery was consulted to weigh in for possible reconstruction.  Following operation patient remaind in SICU for hemodynamic monitoring. He was extubated successfully and was stepped down to the surgical floor 7/14. Vascular surgery was consulted to evaluate interpositional graft and recommended no acute surgical intervention indicated at that time  while noting that right hand had adequate perfusion via radial artery. Physical therapy was consulted and recommended no skilled PT needs. OT was consulted and recommended Home with outpatient hand therapy, Supervision/assist for all ADLs.  Plastics continued to follow the patient alongside with trauma surgery and made recommendations regarding wound care. On 7/27 patient underwent Right Upper Extremity debridement with wound vac placement with Plastic Surgery (Dr. Narvaez). Pt recovered well from surgery and was hemodynamically stable. Pt was on Unasyn since admission and antibiotic was stopped 24 hours after RUE debridement on 7/27. Pt will have wound vac home set up. At the time of discharge, the patient was hemodynamically stable, was tolerating PO diet, was voiding urine and passing stool, was ambulating, and was comfortable with adequate pain control. The patient was instructed to follow up with Dr. Ayers within 1-2 weeks after discharge from the hospital. The patient/family felt comfortable with discharge. The patient was discharged to home. The patient had no other issues.        UP TO DATE AS OF 7/28. 71y Male presented as transfer from [facility] s/p fall resulting in R arm injury after landing on rebar. The fall was witnessed without head strike or loss of consciousness. At [facility] patient underwent ulnar artery interposition graft with autologous vein (7/12/22). He was transferred to [facility] with direct admission to the SICU while intubated and was taken for washout/debridement and exploration by trauma 7/13/22. Plastic surgery was consulted to weigh in for possible reconstruction.  Following operation patient remaind in SICU for hemodynamic monitoring. He was extubated successfully and was stepped down to the surgical floor 7/14. Vascular surgery was consulted to evaluate interpositional graft and recommended no acute surgical intervention indicated at that time  while noting that right hand had adequate perfusion via radial artery. Physical therapy was consulted and recommended no skilled PT needs. OT was consulted and recommended Home with outpatient hand therapy, Supervision/assist for all ADLs.  Plastics continued to follow the patient alongside with trauma surgery and made recommendations regarding wound care. On 7/27 patient underwent Right Upper Extremity debridement with wound vac placement with Plastic Surgery (Dr. Narvaez). Pt recovered well from surgery and was hemodynamically stable. Pt was on Unasyn since admission and antibiotic was stopped 24 hours after RUE debridement on 7/27. Pt will have wound vac home set up. At the time of discharge, the patient was hemodynamically stable, was tolerating PO diet, was voiding urine and passing stool, was ambulating, and was comfortable with adequate pain control. The patient was instructed to follow up with Dr. Ayers and Dr. Levy within 1-2 weeks after discharge from the hospital. The patient/family felt comfortable with discharge. The patient was discharged to rehab. The patient had no other issues.

## 2022-07-28 NOTE — STUDENT SIGN OFF DOCUMENT - COPY OF STUDENT DOCUMENT REVIEW
Occupational Therapy 

## 2022-07-28 NOTE — DISCHARGE NOTE PROVIDER - NSDCMRMEDTOKEN_GEN_ALL_CORE_FT
amLODIPine 10 mg oral tablet: 1 tab(s) orally once a day  valsartan-hydrochlorothiazide 320 mg-25 mg oral tablet: 1 tab(s) orally once a day   acetaminophen 325 mg oral tablet: 2 tab(s) orally every 6 hours, As needed, Mild Pain (1 - 3)  amLODIPine 10 mg oral tablet: 1 tab(s) orally once a day  oxyCODONE 5 mg oral tablet: 1 tab(s) orally every 6 hours, As needed, Moderate Pain (4 - 6)  polyethylene glycol 3350 oral powder for reconstitution: 17 gram(s) orally 2 times a day  senna leaf extract oral tablet: 2 tab(s) orally once a day (at bedtime)  valsartan 320 mg oral tablet: 1 tab(s) orally once a day

## 2022-07-28 NOTE — PROGRESS NOTE ADULT - SUBJECTIVE AND OBJECTIVE BOX
GENERAL SURGERY PROGRESS NOTE    STATUS POST:  RUE Debridement and wound vac placement  POST OPERATIVE DAY #: POD1      SUBJECTIVE  Patient seen and examined. He feels well, denies nausea, vomiting, chest pain, shortness of breath. NPO for OR today with plastics.     OVERNIGHT EVENTS:    10-point review of systems completed and negative except as noted above.      OBJECTIVE    MEDICATIONS  acetaminophen     Tablet .. 650 milliGRAM(s) Oral every 6 hours PRN  amLODIPine   Tablet 10 milliGRAM(s) Oral daily  ampicillin/sulbactam  IVPB 1.5 Gram(s) IV Intermittent every 6 hours  enoxaparin Injectable 40 milliGRAM(s) SubCutaneous every 24 hours  oxyCODONE    IR 5 milliGRAM(s) Oral every 6 hours PRN  oxyCODONE    IR 10 milliGRAM(s) Oral every 6 hours PRN  polyethylene glycol 3350 17 Gram(s) Oral two times a day  senna 2 Tablet(s) Oral at bedtime  valsartan 320 milliGRAM(s) Oral daily      PHYSICAL EXAM  T(C): 37.1 (07-28-22 @ 05:37), Max: 37.3 (07-27-22 @ 18:35)  HR: 75 (07-28-22 @ 05:37) (65 - 99)  BP: 132/72 (07-28-22 @ 05:37) (112/67 - 139/77)  RR: 18 (07-28-22 @ 05:37) (12 - 18)  SpO2: 93% (07-28-22 @ 05:37) (92% - 100%)    07-26-22 @ 07:01  -  07-27-22 @ 07:00  --------------------------------------------------------  IN: 1820 mL / OUT: 0 mL / NET: 1820 mL    07-27-22 @ 07:01  -  07-28-22 @ 06:17  --------------------------------------------------------  IN: 1100 mL / OUT: 10 mL / NET: 1090 mL        General: Appears well, NAD  Neuro: AAOx3  CHEST: Clear to auscultation bilaterally  CV: Regular rate and rhythm  Abdomen: soft, nontender, nondistended, no rebound or guarding  Extremities: Grossly symmetric, RUE wrapped.     LABS                        10.0   6.43  )-----------( 468      ( 28 Jul 2022 05:43 )             31.7     07-28    139  |  103  |  24<H>  ----------------------------<  138<H>  4.1   |  26  |  1.16    Ca    8.8      28 Jul 2022 05:43  Phos  2.4     07-28  Mg     2.3     07-28    TPro  6.5  /  Alb  3.5  /  TBili  0.2  /  DBili  x   /  AST  17  /  ALT  21  /  AlkPhos  90  07-28    PT/INR - ( 27 Jul 2022 11:06 )   PT: 12.8 sec;   INR: 1.10 ratio         PTT - ( 27 Jul 2022 11:06 )  PTT:27.8 sec      RADIOLOGY & ADDITIONAL STUDIES GENERAL SURGERY PROGRESS NOTE    STATUS POST:  RUE Debridement and wound vac placement  POST OPERATIVE DAY #: POD1      SUBJECTIVE  Patient seen and examined. He feels well, denies nausea, vomiting, chest pain, shortness of breath.     OVERNIGHT EVENTS:    10-point review of systems completed and negative except as noted above.      OBJECTIVE    MEDICATIONS  acetaminophen     Tablet .. 650 milliGRAM(s) Oral every 6 hours PRN  amLODIPine   Tablet 10 milliGRAM(s) Oral daily  ampicillin/sulbactam  IVPB 1.5 Gram(s) IV Intermittent every 6 hours  enoxaparin Injectable 40 milliGRAM(s) SubCutaneous every 24 hours  oxyCODONE    IR 5 milliGRAM(s) Oral every 6 hours PRN  oxyCODONE    IR 10 milliGRAM(s) Oral every 6 hours PRN  polyethylene glycol 3350 17 Gram(s) Oral two times a day  senna 2 Tablet(s) Oral at bedtime  valsartan 320 milliGRAM(s) Oral daily      PHYSICAL EXAM  T(C): 37.1 (07-28-22 @ 05:37), Max: 37.3 (07-27-22 @ 18:35)  HR: 75 (07-28-22 @ 05:37) (65 - 99)  BP: 132/72 (07-28-22 @ 05:37) (112/67 - 139/77)  RR: 18 (07-28-22 @ 05:37) (12 - 18)  SpO2: 93% (07-28-22 @ 05:37) (92% - 100%)    07-26-22 @ 07:01  -  07-27-22 @ 07:00  --------------------------------------------------------  IN: 1820 mL / OUT: 0 mL / NET: 1820 mL    07-27-22 @ 07:01  -  07-28-22 @ 06:17  --------------------------------------------------------  IN: 1100 mL / OUT: 10 mL / NET: 1090 mL        General: Appears well, NAD  Neuro: AAOx3  CHEST: Clear to auscultation bilaterally  CV: Regular rate and rhythm  Abdomen: soft, nontender, nondistended, no rebound or guarding  Extremities: Grossly symmetric, RUE wrapped.     LABS                        10.0   6.43  )-----------( 468      ( 28 Jul 2022 05:43 )             31.7     07-28    139  |  103  |  24<H>  ----------------------------<  138<H>  4.1   |  26  |  1.16    Ca    8.8      28 Jul 2022 05:43  Phos  2.4     07-28  Mg     2.3     07-28    TPro  6.5  /  Alb  3.5  /  TBili  0.2  /  DBili  x   /  AST  17  /  ALT  21  /  AlkPhos  90  07-28    PT/INR - ( 27 Jul 2022 11:06 )   PT: 12.8 sec;   INR: 1.10 ratio         PTT - ( 27 Jul 2022 11:06 )  PTT:27.8 sec      RADIOLOGY & ADDITIONAL STUDIES

## 2022-07-28 NOTE — DISCHARGE NOTE PROVIDER - CARE PROVIDER_API CALL
Soha Narvaez)  Plastic Surgery; Surgery  [address], [address]  [place] [address]  Phone: [phone]  Fax: [phone]  Follow Up Time: 1 week   Soha Narvaez)  Plastic Surgery; Surgery  [address], [address]  [place] [address]  Phone: [phone]  Fax: [phone]  Follow Up Time: 1 week    Lion Levy)  Surgery; Surgical Critical Care  [address], [address]  [place] [address]  Phone: [phone]  Fax: [phone]  Follow Up Time: 1 week

## 2022-07-28 NOTE — PROGRESS NOTE ADULT - SUBJECTIVE AND OBJECTIVE BOX
Patient is a 71y old  Male who presents with a chief complaint of Trauma radial artery injury (28 Jul 2022 07:03)      SUBJECTIVE / OVERNIGHT EVENTS: appears comfortable    MEDICATIONS  (STANDING):  amLODIPine   Tablet 10 milliGRAM(s) Oral daily  ampicillin/sulbactam  IVPB 1.5 Gram(s) IV Intermittent every 6 hours  enoxaparin Injectable 40 milliGRAM(s) SubCutaneous every 24 hours  polyethylene glycol 3350 17 Gram(s) Oral two times a day  senna 2 Tablet(s) Oral at bedtime  valsartan 320 milliGRAM(s) Oral daily    MEDICATIONS  (PRN):  acetaminophen     Tablet .. 650 milliGRAM(s) Oral every 6 hours PRN Mild Pain (1 - 3)  oxyCODONE    IR 10 milliGRAM(s) Oral every 6 hours PRN Severe Pain (7 - 10)  oxyCODONE    IR 5 milliGRAM(s) Oral every 6 hours PRN Moderate Pain (4 - 6)        CAPILLARY BLOOD GLUCOSE        I&O's Summary    27 Jul 2022 07:01  -  28 Jul 2022 07:00  --------------------------------------------------------  IN: 1100 mL / OUT: 10 mL / NET: 1090 mL    28 Jul 2022 07:01  -  28 Jul 2022 11:11  --------------------------------------------------------  IN: 360 mL / OUT: 0 mL / NET: 360 mL        PHYSICAL EXAM:  GENERAL: NAD, well-developed  HEAD:  Atraumatic, Normocephalic  EYES: conjunctiva and sclera clear  NECK: Supple, No JVD  CHEST/LUNG: Clear to auscultation bilaterally; No wheeze  HEART: Regular rate and rhythm; No murmurs, rubs, or gallops  ABDOMEN: Soft, Nontender, Nondistended; Bowel sounds present  EXTREMITIES:  RUE wrapped in ace wrap   PSYCH: AAOx3    LABS:                        10.0   6.43  )-----------( 468      ( 28 Jul 2022 05:43 )             31.7     07-28    139  |  103  |  24<H>  ----------------------------<  138<H>  4.1   |  26  |  1.16    Ca    8.8      28 Jul 2022 05:43  Phos  2.4     07-28  Mg     2.3     07-28    TPro  6.5  /  Alb  3.5  /  TBili  0.2  /  DBili  x   /  AST  17  /  ALT  21  /  AlkPhos  90  07-28    PT/INR - ( 27 Jul 2022 11:06 )   PT: 12.8 sec;   INR: 1.10 ratio         PTT - ( 27 Jul 2022 11:06 )  PTT:27.8 sec          RADIOLOGY & ADDITIONAL TESTS:    Imaging Personally Reviewed:    Consultant(s) Notes Reviewed:      Care Discussed with Consultants/Other Providers:

## 2022-07-28 NOTE — STUDENT SIGN OFF DOCUMENT - CO-SIGNATURE DATE
20-Jul-2022 10:40
20-Jul-2022 15:28
21-Jul-2022 14:46
26-Jul-2022 12:08
28-Jul-2022 12:27
28-Jul-2022 10:17
19-Jul-2022 15:30
25-Jul-2022 14:02

## 2022-07-28 NOTE — DISCHARGE NOTE PROVIDER - NSDCCPCAREPLAN_GEN_ALL_CORE_FT
PRINCIPAL DISCHARGE DIAGNOSIS  Diagnosis: Arm wound, right, initial encounter  Assessment and Plan of Treatment: Recovering from surgery  Continue with wound vac therapy.   Continue to use arm brace.   Follow up outpatient with Dr. Levy and Dr. Ayers within 1 week of discharge.      SECONDARY DISCHARGE DIAGNOSES  Diagnosis: Benign essential HTN  Assessment and Plan of Treatment: Stop taking HCTZ. Please continue to take Diovan and Amlodipine.

## 2022-07-28 NOTE — PROGRESS NOTE ADULT - ATTENDING COMMENTS
Patient seen and examined and agree with above.   s/p right wound washout of upper extremity.   Labs reviewed.   Discussed case with plastic surgery who will plan to skin graft either later this week or next week.   Wound looks viable with no necrosis at this time.   Will continue antibiotics at this time.   Continue amlodipine for hypertension.   Continue current pain control.
seen and examined 07-25-22 @ 1103    afeb  AVSS  right hand with normal sensation  moves right fingers    WBC = 8    7/12/2022 @ [facility] – right ulnar artery interposition graft with autologous vein  7/13/2022 @ [facility] – washout of right forearm wound  -washout right forearm wound and possible VAC in OR tomorrow
Patient seen and examined  No new complaints  Able to move thumb & 1st & 2nd fingers  All fingers with excellent cap refill and warm  Wrist mildly hyperextended.    - Care extensively discussed with plastic surgeon, Dr. Soha Narvaez.  Plan is for daily dressing changes & await tissue demarcation.  Will likely need additional procedures to enable wound closure.  - Will ask OT for splint to avoid increasing hyperextension
Limited history  71y Male s/p  fall from ladder and his R arm landed  underwent a bypass procedure for one of the arteries in his arm and anterior fasciotomy at [facility] transferred here intubated without any transfer note including OP note.     S/P wash out of LUE wound in OR  S/p extubated tolerating well on RA  Pain control with Tylenol opoid  Start BB for HTN  PO  LUE warm, dressed, wound care  On Unasyn  BS controlled  Pharm DVT ppx, HCT stable    Spoke to his daughter and son  Discussed with Dr Higgins
seen and examined 07-28-22 @ 0950    afeb  AVSS  right hand with normal sensation  moves right fingers  we relocated the VAC SensaTRAC pad from his distal to his proximal forearm so that he can wear the wrist splint    WBC = 6    7/12/2022 @ [facility] – right ulnar artery interposition graft with autologous vein  7/13/2022 @ [facility] – washout of right forearm wound  7/27/2022 @ [facility] - debridement of right forearm wound / wound VAC placement  -plan to change VAC at bedside with PT team tomorrow  -stop ABx today    dispo  -transfer to [facility] when available  -f/u with Dr Narvaez as an outpatient to plan for STSG once the FDL tendon is granulated

## 2022-07-28 NOTE — PROGRESS NOTE ADULT - ASSESSMENT
71 y/o M presented as transfer from 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 Transferred to [facility] for further management. Taken to the OR by Trauma team for R forearm wound exploration and washout. POD1 s/p RUE debridement w/ vac placement 7/27.    Plan:  - Appreciate trauma recs  - Plan to reseal the wound vac  - Cont two times a day wound care per nursing with xeroform and hydrogel  - Activity- OOB with assistance  - Pain medication as needed   - Discuss case w/ Dr. 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 Transferred to [facility] for further management. Taken to the OR by Trauma team for R forearm wound exploration and washout. POD1 s/p RUE debridement w/ vac placement 7/27.    Plan:  - Appreciate trauma recs  - Plan to reseal the wound vac  - Keep splint in place  - Activity- OOB with assistance  - Pain medication as needed   - Discuss case w/ Dr. Narvaez

## 2022-07-28 NOTE — DISCHARGE NOTE PROVIDER - NSDCCPTREATMENT_GEN_ALL_CORE_FT
PRINCIPAL PROCEDURE  Procedure: Washout of wound of arm  Findings and Treatment:       SECONDARY PROCEDURE  Procedure: Debridement, muscle  Findings and Treatment:

## 2022-07-28 NOTE — STUDENT SIGN OFF DOCUMENT - DOCUMENTS STUDENTS ARE SIGNED OFF ON
Plan of Care/Assessment and Intervention
Plan of Care/Assessment and Intervention
Plan of Care
Plan of Care/Assessment and Intervention
Plan of Care/Assessment and Intervention

## 2022-07-28 NOTE — DISCHARGE NOTE PROVIDER - CARE PROVIDERS DIRECT ADDRESSES
,DirectAddress_Unknown ,DirectAddress_Unknown,[email or web address]

## 2022-07-28 NOTE — STUDENT SIGN OFF DOCUMENT - STUDENT DOCUMENT REVIEW
Yasmin Muhammad

## 2022-07-28 NOTE — DISCHARGE NOTE PROVIDER - PROVIDER TOKENS
PROVIDER:[TOKEN:[[id number]],FOLLOWUP:[1 week]] PROVIDER:[TOKEN:[[id number]],FOLLOWUP:[1 week]],PROVIDER:[TOKEN:[[id number]],FOLLOWUP:[1 week]]

## 2022-07-29 ENCOUNTER — TRANSCRIPTION ENCOUNTER (OUTPATIENT)
Age: 72
End: 2022-07-29

## 2022-07-29 VITALS
DIASTOLIC BLOOD PRESSURE: 76 MMHG | RESPIRATION RATE: 18 BRPM | OXYGEN SATURATION: 96 % | TEMPERATURE: 98 F | SYSTOLIC BLOOD PRESSURE: 135 MMHG | HEART RATE: 66 BPM

## 2022-07-29 PROCEDURE — U0005: CPT

## 2022-07-29 PROCEDURE — 84132 ASSAY OF SERUM POTASSIUM: CPT

## 2022-07-29 PROCEDURE — 73130 X-RAY EXAM OF HAND: CPT

## 2022-07-29 PROCEDURE — 93005 ELECTROCARDIOGRAM TRACING: CPT

## 2022-07-29 PROCEDURE — 82550 ASSAY OF CK (CPK): CPT

## 2022-07-29 PROCEDURE — 97140 MANUAL THERAPY 1/> REGIONS: CPT

## 2022-07-29 PROCEDURE — 84484 ASSAY OF TROPONIN QUANT: CPT

## 2022-07-29 PROCEDURE — 94002 VENT MGMT INPAT INIT DAY: CPT

## 2022-07-29 PROCEDURE — 86850 RBC ANTIBODY SCREEN: CPT

## 2022-07-29 PROCEDURE — 93970 EXTREMITY STUDY: CPT

## 2022-07-29 PROCEDURE — 73206 CT ANGIO UPR EXTRM W/O&W/DYE: CPT

## 2022-07-29 PROCEDURE — 97116 GAIT TRAINING THERAPY: CPT

## 2022-07-29 PROCEDURE — 86803 HEPATITIS C AB TEST: CPT

## 2022-07-29 PROCEDURE — U0003: CPT

## 2022-07-29 PROCEDURE — 80053 COMPREHEN METABOLIC PANEL: CPT

## 2022-07-29 PROCEDURE — 85025 COMPLETE CBC W/AUTO DIFF WBC: CPT

## 2022-07-29 PROCEDURE — 85014 HEMATOCRIT: CPT

## 2022-07-29 PROCEDURE — 85610 PROTHROMBIN TIME: CPT

## 2022-07-29 PROCEDURE — 82330 ASSAY OF CALCIUM: CPT

## 2022-07-29 PROCEDURE — 83036 HEMOGLOBIN GLYCOSYLATED A1C: CPT

## 2022-07-29 PROCEDURE — 84295 ASSAY OF SERUM SODIUM: CPT

## 2022-07-29 PROCEDURE — 73070 X-RAY EXAM OF ELBOW: CPT

## 2022-07-29 PROCEDURE — 82435 ASSAY OF BLOOD CHLORIDE: CPT

## 2022-07-29 PROCEDURE — 84100 ASSAY OF PHOSPHORUS: CPT

## 2022-07-29 PROCEDURE — 36415 COLL VENOUS BLD VENIPUNCTURE: CPT

## 2022-07-29 PROCEDURE — 71045 X-RAY EXAM CHEST 1 VIEW: CPT

## 2022-07-29 PROCEDURE — 82553 CREATINE MB FRACTION: CPT

## 2022-07-29 PROCEDURE — 97110 THERAPEUTIC EXERCISES: CPT

## 2022-07-29 PROCEDURE — 86900 BLOOD TYPING SEROLOGIC ABO: CPT

## 2022-07-29 PROCEDURE — 85027 COMPLETE CBC AUTOMATED: CPT

## 2022-07-29 PROCEDURE — 85018 HEMOGLOBIN: CPT

## 2022-07-29 PROCEDURE — 73110 X-RAY EXAM OF WRIST: CPT

## 2022-07-29 PROCEDURE — 86901 BLOOD TYPING SEROLOGIC RH(D): CPT

## 2022-07-29 PROCEDURE — 83735 ASSAY OF MAGNESIUM: CPT

## 2022-07-29 PROCEDURE — 97161 PT EVAL LOW COMPLEX 20 MIN: CPT

## 2022-07-29 PROCEDURE — 82947 ASSAY GLUCOSE BLOOD QUANT: CPT

## 2022-07-29 PROCEDURE — 73090 X-RAY EXAM OF FOREARM: CPT

## 2022-07-29 PROCEDURE — 85730 THROMBOPLASTIN TIME PARTIAL: CPT

## 2022-07-29 PROCEDURE — 97760 ORTHOTIC MGMT&TRAING 1ST ENC: CPT

## 2022-07-29 PROCEDURE — 82803 BLOOD GASES ANY COMBINATION: CPT

## 2022-07-29 PROCEDURE — C9399: CPT

## 2022-07-29 PROCEDURE — 80048 BASIC METABOLIC PNL TOTAL CA: CPT

## 2022-07-29 PROCEDURE — 99231 SBSQ HOSP IP/OBS SF/LOW 25: CPT

## 2022-07-29 PROCEDURE — 83605 ASSAY OF LACTIC ACID: CPT

## 2022-07-29 PROCEDURE — 99233 SBSQ HOSP IP/OBS HIGH 50: CPT

## 2022-07-29 PROCEDURE — 97530 THERAPEUTIC ACTIVITIES: CPT

## 2022-07-29 PROCEDURE — 97165 OT EVAL LOW COMPLEX 30 MIN: CPT

## 2022-07-29 RX ORDER — SENNA PLUS 8.6 MG/1
2 TABLET ORAL
Qty: 0 | Refills: 0 | DISCHARGE
Start: 2022-07-29

## 2022-07-29 RX ORDER — OXYCODONE HYDROCHLORIDE 5 MG/1
1 TABLET ORAL
Qty: 0 | Refills: 0 | DISCHARGE
Start: 2022-07-29

## 2022-07-29 RX ORDER — POLYETHYLENE GLYCOL 3350 17 G/17G
17 POWDER, FOR SOLUTION ORAL
Qty: 0 | Refills: 0 | DISCHARGE
Start: 2022-07-29

## 2022-07-29 RX ORDER — VALSARTAN 80 MG/1
1 TABLET ORAL
Qty: 0 | Refills: 0 | DISCHARGE
Start: 2022-07-29

## 2022-07-29 RX ORDER — ACETAMINOPHEN 500 MG
2 TABLET ORAL
Qty: 0 | Refills: 0 | DISCHARGE
Start: 2022-07-29

## 2022-07-29 RX ADMIN — ENOXAPARIN SODIUM 40 MILLIGRAM(S): 100 INJECTION SUBCUTANEOUS at 13:43

## 2022-07-29 RX ADMIN — Medication 650 MILLIGRAM(S): at 10:47

## 2022-07-29 RX ADMIN — Medication 650 MILLIGRAM(S): at 11:30

## 2022-07-29 RX ADMIN — VALSARTAN 320 MILLIGRAM(S): 80 TABLET ORAL at 05:14

## 2022-07-29 RX ADMIN — OXYCODONE HYDROCHLORIDE 10 MILLIGRAM(S): 5 TABLET ORAL at 05:14

## 2022-07-29 RX ADMIN — OXYCODONE HYDROCHLORIDE 10 MILLIGRAM(S): 5 TABLET ORAL at 13:42

## 2022-07-29 RX ADMIN — OXYCODONE HYDROCHLORIDE 10 MILLIGRAM(S): 5 TABLET ORAL at 14:50

## 2022-07-29 RX ADMIN — OXYCODONE HYDROCHLORIDE 10 MILLIGRAM(S): 5 TABLET ORAL at 06:10

## 2022-07-29 RX ADMIN — AMLODIPINE BESYLATE 10 MILLIGRAM(S): 2.5 TABLET ORAL at 05:14

## 2022-07-29 NOTE — PROGRESS NOTE ADULT - PROBLEM SELECTOR PLAN 3
BP improved   c/w home norvasc 10 mg daily  c/w home valsartan 320mg   hold hctz 25mg
- resolved   - troponin wnl, EKG negative for ST elevations or depressions, QTC wnl  - likely atypical chest pain, no further workup needed at this time unless recurrent.
resolved   troponin wnl, EKG negative for ST elevations or depressions, QTC wnl  likely atypical chest pain, no further workup needed at this time unless recurrent.
- resolved   - troponin wnl, EKG negative for ST elevations or depressions, QTC wnl  - likely atypical chest pain, no further workup needed at this time unless recurrent.
- resolved now  - troponin wnl, EKG negative for ST elevations or depressions, QTC wnl  - likely atypical chest pain, no further workup needed at this time unless recurrent.
resolved   troponin wnl, EKG negative for ST elevations or depressions, QTC wnl  likely atypical chest pain, no further workup needed at this time unless recurrent.
- resolved   - troponin wnl, EKG negative for ST elevations or depressions, QTC wnl  - likely atypical chest pain, no further workup needed at this time unless recurrent.
BP somewhat labile  c/w home norvasc 10 mg daily  resume valsartan 320mg   hold hctz 25mg
BP improved   c/w home norvasc 10 mg daily  c/w home valsartan 320mg   hold hctz 25mg

## 2022-07-29 NOTE — PROGRESS NOTE ADULT - ASSESSMENT
71M with PMHx of HTN, [facility] after fall with trauma to right forearm, open laceration, s/p  vascular procedure unknown at OSH. Now s/p wound I&D 7/13.    PLAN:  - S/P RUE wound debridement + wound vac placement 7/27l  - Activity- OOB with assistance  - Pain medication as needed   - PT today  - Anticipated Discharge 7/29 to rehab   - DVT ppx  - incentive spirometry  - HTN: dc on amlodipine and diovan, stop taking HCTZ     ACS  [id number]

## 2022-07-29 NOTE — PROGRESS NOTE ADULT - PROBLEM SELECTOR PROBLEM 2
Preoperative examination
Benign essential HTN
Preoperative examination
Benign essential HTN
Preoperative examination
Benign essential HTN
Benign essential HTN

## 2022-07-29 NOTE — DISCHARGE NOTE NURSING/CASE MANAGEMENT/SOCIAL WORK - PATIENT PORTAL LINK FT
You can access the FollowMyHealth Patient Portal offered by [facility] by registering at the following website: [email or web address]. By joining [employer]’s FollowMyHealth portal, you will also be able to view your health information using other applications (apps) compatible with our system.

## 2022-07-29 NOTE — PROGRESS NOTE ADULT - PROBLEM SELECTOR PROBLEM 4
Chest pain
Chest pain
Prophylactic measure
Chest pain
Prophylactic measure

## 2022-07-29 NOTE — PROGRESS NOTE ADULT - PROBLEM SELECTOR PLAN 2
well controlled  -c/w home norvasc 10 mg daily  - if bp elevated SBP>140, can resume home valsartan 320/HCTZ 25 mg daily  - monitor.
controlled  -c/w home norvasc 10 mg daily  - if bp elevated SBP>140, can resume home valsartan 320/HCTZ 25 mg daily  - monitor.
BP improved   c/w home norvasc 10 mg daily  c/w home valsartan 320mg   dc hctz 25mg on discharge
RCRI : 0   CLASS I risk of perioperative  cardiac events with a risk percentage of 3.9%    METS ~ 4 ( expected functional status given age)  Denies CP, SOB on exertion  EKG reviewed ; NSR , no ischemic changes  No absolute contraindications  Low risk procedure    Based on current ACC/AHA guidelines, pt history and physical exam , the pt is considered to have low risk from a cardiovascular standpoint for planned procedure.    No further testing needed prior to procedure.
controlled  -c/w home norvasc 10 mg daily  - if bp elevated SBP>140, can resume home valsartan 320/HCTZ 25 mg daily  - monitor.
BP improved   c/w home norvasc 10 mg daily  c/w home valsartan 320mg   hold hctz 25mg
RCRI : 0   CLASS I risk of perioperative  cardiac events with a risk percentage of 3.9%    METS ~ 4 ( expected functional status given age)  Denies CP, SOB on exertion  EKG reviewed ; NSR , no ischemic changes  No absolute contraindications  Low risk procedure  Based on current ACC/AHA guidelines, pt history and physical exam , the pt is considered to have low risk from a cardiovascular standpoint for planned procedure.  No further testing needed prior to procedure.
controlled  -c/w home norvasc 10 mg daily  - if bp elevated SBP>140, can resume home valsartan 320/HCTZ 25 mg daily  - monitor.
RCRI : 0 CLASS I risk of perioperative  cardiac events with a risk percentage of 3.9%    METS ~ 4 ( expected functional status given age)  Denies CP, SOB on exertion  EKG reviewed ; NSR , no ischemic changes  No absolute contraindications  Low risk procedure  Based on current ACC/AHA guidelines, pt history and physical exam , the pt is considered to have low risk from a cardiovascular standpoint for planned procedure.  No further testing needed prior to procedure.

## 2022-07-29 NOTE — PROGRESS NOTE ADULT - REASON FOR ADMISSION
Trauma radial artery injury
Trauma ulnar artery injury
Trauma radial artery injury

## 2022-07-29 NOTE — PROGRESS NOTE ADULT - PROBLEM SELECTOR PROBLEM 1
Arm wound, right, initial encounter

## 2022-07-29 NOTE — PROGRESS NOTE ADULT - PROBLEM SELECTOR PLAN 4
DVT PPX: lovenox  No PT needs.
DVT PPX: lovenox  No PT needs.
resolved   troponin wnl, EKG negative for ST elevations or depressions, QTC wnl  likely atypical chest pain, no further workup needed at this time unless recurrent.
DVT PPX: lovenox  For BENNIE
DVT PPX: lovenox  No PT needs.
resolved   troponin wnl, EKG negative for ST elevations or depressions, QTC wnl  likely atypical chest pain, no further workup needed at this time unless recurrent.
resolved   troponin wnl, EKG negative for ST elevations or depressions, QTC wnl  likely atypical chest pain, no further workup needed at this time unless recurrent.

## 2022-07-29 NOTE — PROGRESS NOTE ADULT - PROBLEM SELECTOR PROBLEM 3
Benign essential HTN
Chest pain
Benign essential HTN
Chest pain
Benign essential HTN
Chest pain
Chest pain

## 2022-07-29 NOTE — PROGRESS NOTE ADULT - SUBJECTIVE AND OBJECTIVE BOX
Patient is a 71y old  Male who presents with a chief complaint of Trauma radial artery injury (28 Jul 2022 11:11)      SUBJECTIVE / OVERNIGHT EVENTS: feels ok, pain in right arm is tolerable, no cp, sob, abdominal pain, had bm, d/w plastics at bedside, wrapping arm in ace     MEDICATIONS  (STANDING):  amLODIPine   Tablet 10 milliGRAM(s) Oral daily  enoxaparin Injectable 40 milliGRAM(s) SubCutaneous every 24 hours  polyethylene glycol 3350 17 Gram(s) Oral two times a day  senna 2 Tablet(s) Oral at bedtime  valsartan 320 milliGRAM(s) Oral daily    MEDICATIONS  (PRN):  acetaminophen     Tablet .. 650 milliGRAM(s) Oral every 6 hours PRN Mild Pain (1 - 3)  oxyCODONE    IR 10 milliGRAM(s) Oral every 6 hours PRN Severe Pain (7 - 10)  oxyCODONE    IR 5 milliGRAM(s) Oral every 6 hours PRN Moderate Pain (4 - 6)        CAPILLARY BLOOD GLUCOSE        I&O's Summary    28 Jul 2022 07:01  -  29 Jul 2022 07:00  --------------------------------------------------------  IN: 1680 mL / OUT: 0 mL / NET: 1680 mL    29 Jul 2022 07:01  -  29 Jul 2022 10:24  --------------------------------------------------------  IN: 360 mL / OUT: 0 mL / NET: 360 mL        PHYSICAL EXAM:  GENERAL: NAD, well-developed  HEAD:  Atraumatic, Normocephalic  EYES: conjunctiva and sclera clear  NECK: Supple, No JVD  CHEST/LUNG: Clear to auscultation bilaterally; No wheeze  HEART: Regular rate and rhythm; No murmurs, rubs, or gallops  ABDOMEN: Soft, Nontender, Nondistended; Bowel sounds present  EXTREMITIES:  RUE wrapped in ace wrap   PSYCH: AAOx3  LABS:                        10.0   6.43  )-----------( 468      ( 28 Jul 2022 05:43 )             31.7     07-28    139  |  103  |  24<H>  ----------------------------<  138<H>  4.1   |  26  |  1.16    Ca    8.8      28 Jul 2022 05:43  Phos  2.4     07-28  Mg     2.3     07-28    TPro  6.5  /  Alb  3.5  /  TBili  0.2  /  DBili  x   /  AST  17  /  ALT  21  /  AlkPhos  90  07-28    PT/INR - ( 27 Jul 2022 11:06 )   PT: 12.8 sec;   INR: 1.10 ratio         PTT - ( 27 Jul 2022 11:06 )  PTT:27.8 sec          RADIOLOGY & ADDITIONAL TESTS:    Imaging Personally Reviewed:    Consultant(s) Notes Reviewed:      Care Discussed with Consultants/Other Providers:

## 2022-07-29 NOTE — PROGRESS NOTE ADULT - PROBLEM SELECTOR PLAN 1
s/p fasciotomy and wound washout  Now s/p wound I&D 7/13.  Plastics following for flap coverage/reconstruction ( tentatively scheduled for 7/27; see preop assessment below)  Cont daily wound care  Pain control  On IV unasyn duration per sx
s/p fasciotomy and wound washout  Now s/p wound I&D 7/13.  S/p RUE debridement w/ vac placement 7/27  Cont daily wound care  Pain control  S/p course of IV unasyn
s/p fasciotomy and wound washout  Now s/p wound I&D 7/13.  S/p RUE debridement w/ vac placement 7/27  Cont daily wound care  Pain control  On IV unasyn duration per sx
s/p fasciotomy and wound washout  Now s/p wound I&D 7/13.  Plastics following for flap coverage/reconstruction ( tentatively scheduled for 7/26; see preop assessment below)  Cont daily wound care  Pain control  On IV unasyn duration per sx
s/p fasciotomy and wound washout  Now s/p wound I&D 7/13.  Plastics consulted for possible flap coverage/reconstruction.  Cont daily wound care  IV unasyn.
s/p fasciotomy and wound washout  Now s/p wound I&D 7/13.  Plastics following for possible flap coverage/reconstruction ; see preop assessment below  Cont daily wound care  c/w IV unasyn.
s/p fasciotomy and wound washout  Now s/p wound I&D 7/13.  Plastics following for flap coverage/reconstruction ( tentatively scheduled for 7/26; see preop assessment below)  Cont daily wound care  Pain control  On IV unasyn duration per sx
s/p fasciotomy and wound washout  Now s/p wound I&D 7/13.  Plastics following ; for OR this week for possible flap coverage/reconstruction ; see preop assessment below  Cont daily wound care  on IV unasyn.
s/p fasciotomy and wound washout  Now s/p wound I&D 7/13.  Plastics following for  flap coverage/reconstruction ( tentatively scheduled for 7/25; see preop assessment below)  Cont daily wound care  Pain control  On IV unasyn.

## 2022-07-29 NOTE — DISCHARGE NOTE NURSING/CASE MANAGEMENT/SOCIAL WORK - NSDCPEFALRISK_GEN_ALL_CORE
For information on Fall & Injury Prevention, visit: [email or web address] OR  [email or web address] OR  https://www.cdc.gov/steadi/patient.html

## 2022-07-29 NOTE — PROGRESS NOTE ADULT - NS ATTEND AMEND GEN_ALL_CORE FT
seen and examined 07-26-22 @ 0930    afeb  AVSS  right hand with normal sensation  moves right fingers    WBC = 8    7/12/2022 @ [facility] – right ulnar artery interposition graft with autologous vein  7/13/2022 @ [facility] – washout of right forearm wound  -washout right forearm wound and possible VAC in OR with plastic surgery Dr Narvaez this afternoon
seen and examined 07-29-22 @ 1002    afeb  AVSS  right hand with normal sensation  moves right fingers, but unable to extend right 4th and 5th fingers  VAC is removed in preparation for transfer to [facility]    WBC (7/28) = 6    7/12/2022 @ [facility] – right ulnar artery interposition graft with autologous vein  7/13/2022 @ [facility] – washout of right forearm wound  7/27/2022 @ [facility] - debridement of right forearm wound / wound VAC placement  -plan to change VAC at bedside with PT team tomorrow  -stop ABx today    dispo  -transfer to [facility] today  -f/u with Dr Narvaez as an outpatient to plan for STSG once the FDL tendon is granulated
seen and examined 07-27-22 @ 0900    afeb  AVSS  right hand with normal sensation  moves right fingers    WBC = 6    7/12/2022 @ [facility] – right ulnar artery interposition graft with autologous vein  7/13/2022 @ [facility] – washout of right forearm wound  -washout right forearm wound and possible VAC in OR with plastic surgery Dr Narvaez this afternoon

## 2022-07-29 NOTE — PROGRESS NOTE ADULT - PROVIDER SPECIALTY LIST ADULT
Hospitalist
SICU
Trauma Surgery
Trauma Surgery
Hospitalist
Plastic Surgery
Surgery
Trauma Surgery
Trauma Surgery
Hospitalist
Plastic Surgery
Surgery
Trauma Surgery
Plastic Surgery
Surgery
Trauma Surgery
Hospitalist

## 2022-07-29 NOTE — PROGRESS NOTE ADULT - SUBJECTIVE AND OBJECTIVE BOX
SURGERY DAILY PROGRESS NOTE:       SUBJECTIVE/ROS: Patient seen and evaluated on AM rounds. Patient feels well.   Denies nausea, vomiting, chest pain, shortness of breath.        OBJECTIVE:    Vital Signs Last 24 Hrs  T(C): 36.8 (29 Jul 2022 09:46), Max: 37 (29 Jul 2022 05:00)  T(F): 98.3 (29 Jul 2022 09:46), Max: 98.6 (29 Jul 2022 05:00)  HR: 67 (29 Jul 2022 09:46) (60 - 78)  BP: 120/69 (29 Jul 2022 09:46) (109/65 - 138/79)  BP(mean): --  RR: 18 (29 Jul 2022 09:46) (18 - 18)  SpO2: 95% (29 Jul 2022 09:46) (92% - 97%)    Parameters below as of 29 Jul 2022 09:46  Patient On (Oxygen Delivery Method): room air      I&O's Detail    28 Jul 2022 07:01  -  29 Jul 2022 07:00  --------------------------------------------------------  IN:    Oral Fluid: 1680 mL  Total IN: 1680 mL    OUT:  Total OUT: 0 mL    Total NET: 1680 mL      29 Jul 2022 07:01  -  29 Jul 2022 10:33  --------------------------------------------------------  IN:    Oral Fluid: 360 mL  Total IN: 360 mL    OUT:  Total OUT: 0 mL    Total NET: 360 mL        Daily     Daily   MEDICATIONS  (STANDING):  amLODIPine   Tablet 10 milliGRAM(s) Oral daily  enoxaparin Injectable 40 milliGRAM(s) SubCutaneous every 24 hours  polyethylene glycol 3350 17 Gram(s) Oral two times a day  senna 2 Tablet(s) Oral at bedtime  valsartan 320 milliGRAM(s) Oral daily    MEDICATIONS  (PRN):  acetaminophen     Tablet .. 650 milliGRAM(s) Oral every 6 hours PRN Mild Pain (1 - 3)  oxyCODONE    IR 10 milliGRAM(s) Oral every 6 hours PRN Severe Pain (7 - 10)  oxyCODONE    IR 5 milliGRAM(s) Oral every 6 hours PRN Moderate Pain (4 - 6)      LABS:                        10.0   6.43  )-----------( 468      ( 28 Jul 2022 05:43 )             31.7     07-28    139  |  103  |  24<H>  ----------------------------<  138<H>  4.1   |  26  |  1.16    Ca    8.8      28 Jul 2022 05:43  Phos  2.4     07-28  Mg     2.3     07-28    TPro  6.5  /  Alb  3.5  /  TBili  0.2  /  DBili  x   /  AST  17  /  ALT  21  /  AlkPhos  90  07-28    PT/INR - ( 27 Jul 2022 11:06 )   PT: 12.8 sec;   INR: 1.10 ratio         PTT - ( 27 Jul 2022 11:06 )  PTT:27.8 sec              PHYSICAL EXAM:  General: Appears well, NAD  Neuro: AAOx3  CHEST: non labored breathing on room air   Abdomen: soft, nontender, nondistended, no rebound or guarding  Extremities: Grossly symmetric, RUE with wound vac on forearm

## 2022-08-11 ENCOUNTER — APPOINTMENT (OUTPATIENT)
Dept: PHYSICAL MEDICINE AND REHAB | Facility: CLINIC | Age: 72
End: 2022-08-11

## 2022-08-19 ENCOUNTER — OUTPATIENT (OUTPATIENT)
Dept: OUTPATIENT SERVICES | Facility: HOSPITAL | Age: 72
LOS: 1 days | End: 2022-08-19

## 2022-08-19 VITALS
RESPIRATION RATE: 16 BRPM | TEMPERATURE: 98 F | WEIGHT: 190.92 LBS | DIASTOLIC BLOOD PRESSURE: 76 MMHG | HEIGHT: 70 IN | OXYGEN SATURATION: 98 % | SYSTOLIC BLOOD PRESSURE: 140 MMHG | HEART RATE: 65 BPM

## 2022-08-19 DIAGNOSIS — Z87.828 PERSONAL HISTORY OF OTHER (HEALED) PHYSICAL INJURY AND TRAUMA: Chronic | ICD-10-CM

## 2022-08-19 DIAGNOSIS — R06.83 SNORING: ICD-10-CM

## 2022-08-19 DIAGNOSIS — Z85.89 PERSONAL HISTORY OF MALIGNANT NEOPLASM OF OTHER ORGANS AND SYSTEMS: Chronic | ICD-10-CM

## 2022-08-19 DIAGNOSIS — T14.8XXA OTHER INJURY OF UNSPECIFIED BODY REGION, INITIAL ENCOUNTER: ICD-10-CM

## 2022-08-19 DIAGNOSIS — S51.801A UNSPECIFIED OPEN WOUND OF RIGHT FOREARM, INITIAL ENCOUNTER: ICD-10-CM

## 2022-08-19 LAB
ANION GAP SERPL CALC-SCNC: 11 MMOL/L — SIGNIFICANT CHANGE UP (ref 7–14)
BUN SERPL-MCNC: 23 MG/DL — SIGNIFICANT CHANGE UP (ref 7–23)
CALCIUM SERPL-MCNC: 9.6 MG/DL — SIGNIFICANT CHANGE UP (ref 8.4–10.5)
CHLORIDE SERPL-SCNC: 105 MMOL/L — SIGNIFICANT CHANGE UP (ref 98–107)
CO2 SERPL-SCNC: 25 MMOL/L — SIGNIFICANT CHANGE UP (ref 22–31)
CREAT SERPL-MCNC: 0.99 MG/DL — SIGNIFICANT CHANGE UP (ref 0.5–1.3)
EGFR: 82 ML/MIN/1.73M2 — SIGNIFICANT CHANGE UP
GLUCOSE SERPL-MCNC: 103 MG/DL — HIGH (ref 70–99)
HCT VFR BLD CALC: 39.7 % — SIGNIFICANT CHANGE UP (ref 39–50)
HGB BLD-MCNC: 12.7 G/DL — LOW (ref 13–17)
MCHC RBC-ENTMCNC: 30.2 PG — SIGNIFICANT CHANGE UP (ref 27–34)
MCHC RBC-ENTMCNC: 32 GM/DL — SIGNIFICANT CHANGE UP (ref 32–36)
MCV RBC AUTO: 94.3 FL — SIGNIFICANT CHANGE UP (ref 80–100)
NRBC # BLD: 0 /100 WBCS — SIGNIFICANT CHANGE UP (ref 0–0)
NRBC # FLD: 0 K/UL — SIGNIFICANT CHANGE UP (ref 0–0)
PLATELET # BLD AUTO: 354 K/UL — SIGNIFICANT CHANGE UP (ref 150–400)
POTASSIUM SERPL-MCNC: 3.5 MMOL/L — SIGNIFICANT CHANGE UP (ref 3.5–5.3)
POTASSIUM SERPL-SCNC: 3.5 MMOL/L — SIGNIFICANT CHANGE UP (ref 3.5–5.3)
RBC # BLD: 4.21 M/UL — SIGNIFICANT CHANGE UP (ref 4.2–5.8)
RBC # FLD: 13.4 % — SIGNIFICANT CHANGE UP (ref 10.3–14.5)
SODIUM SERPL-SCNC: 141 MMOL/L — SIGNIFICANT CHANGE UP (ref 135–145)
WBC # BLD: 7.81 K/UL — SIGNIFICANT CHANGE UP (ref 3.8–10.5)
WBC # FLD AUTO: 7.81 K/UL — SIGNIFICANT CHANGE UP (ref 3.8–10.5)

## 2022-08-19 PROCEDURE — 93010 ELECTROCARDIOGRAM REPORT: CPT

## 2022-08-19 RX ORDER — AMLODIPINE BESYLATE 2.5 MG/1
1 TABLET ORAL
Qty: 0 | Refills: 0 | DISCHARGE

## 2022-08-19 NOTE — H&P PST ADULT - PROBLEM SELECTOR PROBLEM 1
PT arrives to ED with mother who gives consent to treat.  Mother states pt had one episode vomiting on Sunday followed by a fever starting Monday and multiple episodes of diarrhea yesterday.  Lungs clear to auscultation.  Mother states patient is tolerating food and liquids.  Pt in NAD at this time.      Open wound

## 2022-08-19 NOTE — H&P PST ADULT - PRIMARY CARE PROVIDER
pcp, Biju, [address] [place] [address]  [phone] pcp, Biju, [address] [address] [place] [address]  [phone]

## 2022-08-19 NOTE — H&P PST ADULT - EYES
My father and mother will lock up all medications and sharps and will administer all medications to me.
EOMI

## 2022-08-19 NOTE — H&P PST ADULT - HISTORY OF PRESENT ILLNESS
This is a 72 y/o male who presents with injury to his left arm when he was on a ladder. Subsequent xrays and MRI confirmed pathology. Had surgery x 2 on the arm. Further intervention warranted. Scheduled for  This is a 72 y/o male who presents with injury to his left arm when he was on a ladder. Subsequent xrays and MRI confirmed pathology. Had surgery x 2 on the arm. Dressing in place.  Further intervention warranted. Scheduled for split thickness skin graft right upper extremity, VAC placement

## 2022-08-19 NOTE — H&P PST ADULT - NSICDXPASTMEDICALHX_GEN_ALL_CORE_FT
PAST MEDICAL HISTORY:  HTN (hypertension)     Knee pain, left     Open wound 2022  right upper extremity    Parotid mass left - benign    Squamous cell carcinoma scalp

## 2022-08-19 NOTE — H&P PST ADULT - NSICDXPASTSURGICALHX_GEN_ALL_CORE_FT
PAST SURGICAL HISTORY:  H/O open arm wound 2 surgeries on the right upper arm    H/O squamous cell carcinoma removed form the scalp in 2016

## 2022-08-19 NOTE — H&P PST ADULT - PROBLEM SELECTOR PLAN 1
This is a 72 y/o male who is s scheduled for split thickness skin grat right upper extremity, vac [id number]  * Scheduled for covid testing, as per the patient  * Given preop instructions  * Await stress test and echo by cardiologist  * Instructed to take normal am dose of amlodipine and valsartan/HCTZ the am of surgery

## 2022-08-29 ENCOUNTER — EMERGENCY (EMERGENCY)
Facility: HOSPITAL | Age: 72
LOS: 1 days | Discharge: HOME | End: 2022-08-29
Attending: EMERGENCY MEDICINE
Payer: MEDICARE

## 2022-08-29 VITALS
SYSTOLIC BLOOD PRESSURE: 150 MMHG | WEIGHT: 186.95 LBS | HEART RATE: 81 BPM | RESPIRATION RATE: 16 BRPM | OXYGEN SATURATION: 97 % | DIASTOLIC BLOOD PRESSURE: 75 MMHG | HEIGHT: 70 IN | TEMPERATURE: 98 F

## 2022-08-29 DIAGNOSIS — Z87.828 PERSONAL HISTORY OF OTHER (HEALED) PHYSICAL INJURY AND TRAUMA: Chronic | ICD-10-CM

## 2022-08-29 DIAGNOSIS — Z85.89 PERSONAL HISTORY OF MALIGNANT NEOPLASM OF OTHER ORGANS AND SYSTEMS: Chronic | ICD-10-CM

## 2022-08-29 PROBLEM — T14.8XXA OTHER INJURY OF UNSPECIFIED BODY REGION, INITIAL ENCOUNTER: Chronic | Status: ACTIVE | Noted: 2022-08-19

## 2022-08-29 PROCEDURE — 99283 EMERGENCY DEPT VISIT LOW MDM: CPT | Mod: FS

## 2022-08-29 PROCEDURE — 99283 EMERGENCY DEPT VISIT LOW MDM: CPT

## 2022-08-29 RX ADMIN — Medication 100 MILLIGRAM(S): at 15:24

## 2022-08-29 NOTE — ED PROVIDER NOTE - CLINICAL SUMMARY MEDICAL DECISION MAKING FREE TEXT BOX
magnus mcclellan wound graft with newe redness no discharge no new pain no constitutional s/s - seen at urgent care and referred to ed -- no streaking redness- wound well appearing no discharge -minimal pinkness proximal aspect early cellulitis v inflam effect-  will give abx and have pt fu w dr phelan

## 2022-08-29 NOTE — ED PROVIDER NOTE - PATIENT PORTAL LINK FT
You can access the FollowMyHealth Patient Portal offered by [facility] by registering at the following website: [email or web address]. By joining [facility]’s FollowMyHealth portal, you will also be able to view your health information using other applications (apps) compatible with our system.

## 2022-08-29 NOTE — ED PROVIDER NOTE - PROGRESS NOTE DETAILS
[facility] bedsice - wound well appearing no discharge -minmal pinkness proximal aspect early cellulitis v inflam effect-  will give abx and have pt fu

## 2022-08-29 NOTE — ED PROVIDER NOTE - NS ED ATTENDING STATEMENT MOD
This was a shared visit with the MAICOL. I reviewed and verified the documentation and independently performed the documented:

## 2022-08-29 NOTE — ED PROVIDER NOTE - OBJECTIVE STATEMENT
71 y.o. male sent in from urgent care for redness to surgical site on right forearm.  Pt is followed by Soha Narvaez who did a graft/flap on him about a month ago, has been doing very well since the procedure with pain improving daily  Was scheduled for a flap tomorrow at [facility], however when speaking with her this morning he mentioned he had a little redness around one of the wound margins.  No constitutional symptoms, no fevers, no chills, no worsening pain or weakness.  Seen at  that told him to come to the ER, Dr Narvaez is in the ED awaiting his arrival to evaluate.

## 2022-08-29 NOTE — ED PROVIDER NOTE - SKIN, MLM
Large surgical wound encompassing most of the anterior right forearm.  On the medial aspet of the wound thereis a very light pink blanching area that is slightly warm

## 2022-08-29 NOTE — ED PROVIDER NOTE - ATTENDING APP SHARED VISIT CONTRIBUTION OF CARE
This was a shared visit with MAICOL.  I have reviewed and discussed the case with the MAICOL and agree with verified documentation unless otherwise documented.  I have independently spoken with and examined the patient and my documentation of history/pe and MDM are above.

## 2022-08-29 NOTE — ED PROVIDER NOTE - NSFOLLOWUPINSTRUCTIONS_ED_ALL_ED_FT
1- Doxycycline 100 mg every 12 hours for the next 10 days  2- Follow up as instructed to by Dr Narvaez  3- If you have worsening redness, pus, fevers, chills, worsening pain or swelling or any new or worsening symptoms come back to the ER immediately

## 2022-08-29 NOTE — ED PROVIDER NOTE - CARE PROVIDER_API CALL
good, to achieve stated therapy goals Soha Narvaez)  Plastic Surgery; Surgery  [address], [address]  [place] [address]  Phone: [phone]  Fax: [phone]  Follow Up Time:

## 2023-04-14 NOTE — ED PROVIDER NOTE - DISPOSITION TYPE
PT IE completed. Chart reviewed. Patient without complaints of pain at rest, agreeable to PT. Patient received seated on toilet, NAD, +surgical bra donned, +4 JPs intact, +2 dopplers, +abd dressing C/D/I, +(R)IV,  (Yahir) at bedside, ITALIA Mcdermott cleared patient for treatment.
DISCHARGE

## 2023-04-30 NOTE — H&P ADULT - ASSESSMENT
71M with PMHx of HTN, presents from OSH with trauma to right radial artery s/p fall s/p OSH vascular procedure, now under SICU care for management of vent    Plan  admit to SICU under trauma surgery under Dr. Praveena Higgins  plan for add-on later on today for wound washout and possible placement of wound vac  care per SICU     Plan discussed with and approved by Dr. Praveena Higgins    [facility] Surgery   [id number] 1.Recommend provide Ensure Clear TID (240kcal, 8gm protein, 8oz per carton)  2.Recommend provide Prosource Gelatein Plus TID (160kcal, 20gm protein, 4oz per carton)

## 2023-05-23 NOTE — PATIENT PROFILE ADULT - FUNCTIONAL SCREEN CURRENT LEVEL: COMMUNICATION, MLM
Requested medication(s) are due for refill today: Yes  Patient has already received a courtesy refill: No  Other reason request has been forwarded to provider: 0 = understands/communicates without difficulty

## 2023-09-13 NOTE — DIETITIAN INITIAL EVALUATION ADULT - EDUCATION DIETARY MODIFICATIONS
Introduction Text (Please End With A Colon): The following procedure was deferred: X Size Of Lesion In Cm (Optional): 0 Procedure To Be Performed At Next Visit: Excision Detail Level: Detailed Encouraged continued good PO intake as tolerated with emphasis on protein. Education provided on good food sources of protein including meat, fish, and eggs. Pt amenable to recommendations, made aware RD remains available./(2) meets goals/outcomes/verbalization

## 2023-12-17 NOTE — PROGRESS NOTE ADULT - SUBJECTIVE AND OBJECTIVE BOX
Plastic Surgery    SUBJECTIVE: Pt seen and examined on rounds with team. No acute events overnight.        OBJECTIVE    PHYSICAL EXAM:   General: NAD, Lying in bed   Neuro: Awake and alert  Extremities:   RUE seen w/ wound vac in place, intermittent suction, seal is holding; plan to reassess wound vac and seal.      VITALS  T(C): 37.1 (07-28-22 @ 05:37), Max: 37.3 (07-27-22 @ 18:35)  HR: 74 (07-28-22 @ 06:44) (65 - 99)  BP: 112/70 (07-28-22 @ 06:44) (112/67 - 139/77)  RR: 18 (07-28-22 @ 05:37) (12 - 18)  SpO2: 93% (07-28-22 @ 05:37) (92% - 100%)  CAPILLARY BLOOD GLUCOSE          Is/Os    07-27 @ 07:01  -  07-28 @ 07:00  --------------------------------------------------------  IN:    IV PiggyBack: 100 mL    Lactated Ringers: 400 mL    Oral Fluid: 600 mL  Total IN: 1100 mL    OUT:    VAC (Vacuum Assisted Closure) System (mL): 10 mL  Total OUT: 10 mL    Total NET: 1090 mL          MEDICATIONS (STANDING): amLODIPine   Tablet 10 milliGRAM(s) Oral daily  ampicillin/sulbactam  IVPB 1.5 Gram(s) IV Intermittent every 6 hours  enoxaparin Injectable 40 milliGRAM(s) SubCutaneous every 24 hours  polyethylene glycol 3350 17 Gram(s) Oral two times a day  senna 2 Tablet(s) Oral at bedtime  valsartan 320 milliGRAM(s) Oral daily    MEDICATIONS (PRN):acetaminophen     Tablet .. 650 milliGRAM(s) Oral every 6 hours PRN Mild Pain (1 - 3)  oxyCODONE    IR 10 milliGRAM(s) Oral every 6 hours PRN Severe Pain (7 - 10)  oxyCODONE    IR 5 milliGRAM(s) Oral every 6 hours PRN Moderate Pain (4 - 6)      LABS  CBC (07-28 @ 05:43)                              10.0<L>                         6.43    )----------------(  468<H>     --    % Neutrophils, --    % Lymphocytes, ANC: --                                  31.7<L>  CBC (07-27 @ 11:06)                              10.7<L>                         6.93    )----------------(  451<H>     --    % Neutrophils, --    % Lymphocytes, ANC: --                                  33.3<L>    BMP (07-28 @ 05:43)             139     |  103     |  24<H> 		Ca++ --      Ca 8.8                ---------------------------------( 138<H>		Mg 2.3                4.1     |  26      |  1.16  			Ph 2.4<L>  BMP (07-27 @ 11:06)             139     |  103     |  18    		Ca++ --      Ca 9.1                ---------------------------------( 111<H>		Mg 2.0                4.5     |  28      |  1.06  			Ph 2.7       LFTs (07-28 @ 05:43)      TPro 6.5 / Alb 3.5 / TBili 0.2 / DBili -- / AST 17 / ALT 21 / AlkPhos 90    Coags (07-27 @ 11:06)  aPTT 27.8 / INR 1.10 / PT 12.8            IMAGING STUDIES     show

## 2024-03-25 NOTE — CONSULT NOTE ADULT - ATTENDING SUPERVISION STATEMENT
Resident I have personally seen and examined the patient. I have collaborated with and supervised the

## 2024-11-02 NOTE — PHYSICAL THERAPY INITIAL EVALUATION ADULT - DID THE PATIENT HAVE SURGERY?
Problem: Pain  Goal: Acceptable pain level achieved/maintained at rest using appropriate pain scale for the patient  Outcome: Monitoring/Evaluating progress  Goal: Acceptable pain level achieved/maintained with activity using appropriate pain scale for the patient  Outcome: Monitoring/Evaluating progress  Goal: Acceptable pain level achieved/maintained without oversedation  Outcome: Monitoring/Evaluating progress     Problem: At Risk for Falls  Goal: Patient does not fall  Outcome: Monitoring/Evaluating progress  Goal: Patient takes action to control fall-related risks  Outcome: Monitoring/Evaluating progress     Problem: At Risk for Injury Due to Fall  Goal: Patient does not fall  Outcome: Monitoring/Evaluating progress  Goal: Takes action to control condition specific risks  Outcome: Monitoring/Evaluating progress  Goal: Verbalizes understanding of fall-related injury personal risks  Description: Document education using the patient education activity  Outcome: Monitoring/Evaluating progress      s/p Right forearm wound exploration and washout/yes

## 2025-07-11 NOTE — PATIENT PROFILE ADULT - SURGICAL SITE DESCRIPTION
Reached out to Bianca (daughter) to get an update on pt. She states pt is unable to talk but alert with dull eyes. Hospice has returned and they put in a comfort kit for pt. Pt is officially with hospice.    right arm

## (undated) DEVICE — DRAIN JACKSON PRATT 7MM FLAT FULL NO TROCAR

## (undated) DEVICE — DRAPE SPLIT SHEET 77" X 108"

## (undated) DEVICE — DRSG VAC WHITEFOAM LARGE (WHITE)

## (undated) DEVICE — GAMMA SLEEVE DISPOSABLE

## (undated) DEVICE — LAP PAD 18 X 18"

## (undated) DEVICE — DRAIN BLAKE 15FR BARD CHANNEL

## (undated) DEVICE — DRSG STOCKINETTE IMPERVIOUS MED

## (undated) DEVICE — DRAPE 3/4 SHEET W REINFORCEMENT 56X77"

## (undated) DEVICE — VISITEC 4X4

## (undated) DEVICE — DRAIN RESERVOIR FOR JACKSON PRATT 100CC CARDINAL

## (undated) DEVICE — DRSG COBAN 4"

## (undated) DEVICE — SUT POLYSORB 3-0 30" V-20 UNDYED

## (undated) DEVICE — DRSG STERISTRIPS 0.5 X 4"

## (undated) DEVICE — STRYKER INTERPULSE HANDPIECE W IRR SUCTION TUBE

## (undated) DEVICE — STAPLER SKIN VISI-STAT 35 WIDE

## (undated) DEVICE — FOLEY TRAY 16FR 5CC LTX UMETER CLOSED

## (undated) DEVICE — MEDICATION LABELS W MARKER

## (undated) DEVICE — PACK EXTREMITY

## (undated) DEVICE — BLADE SCALPEL SAFETYLOCK #15

## (undated) DEVICE — WARMING BLANKET LOWER ADULT

## (undated) DEVICE — DRSG TEGADERM 1.75X1.75"

## (undated) DEVICE — PACK MAJOR ABDOMINAL SUPINE

## (undated) DEVICE — SOL IRR POUR H2O 250ML

## (undated) DEVICE — DRAPE TOWEL BLUE 17" X 24"

## (undated) DEVICE — MARKING PEN W RULER

## (undated) DEVICE — DRSG VAC GRANUFOAM MEDIUM (BLACK)

## (undated) DEVICE — DRSG STOCKINETTE IMPERVIOUS XL

## (undated) DEVICE — LIGASURE SMALL JAW

## (undated) DEVICE — DRSG DERMABOND MINI

## (undated) DEVICE — BLADE SCALPEL SAFETYLOCK #10

## (undated) DEVICE — GLV 8.5 PROTEXIS (WHITE)

## (undated) DEVICE — CANISTER KCI 500ML GEL SENSA TRAC

## (undated) DEVICE — DRSG OPSITE 13.75 X 4"

## (undated) DEVICE — CANISTER W/GEL INFOVAC 1000ML

## (undated) DEVICE — DRAPE MAYO STAND 30"

## (undated) DEVICE — SOL IRR POUR NS 0.9% 500ML

## (undated) DEVICE — DRSG VAC GRANUFOAM SMALL (BLACK)

## (undated) DEVICE — DRSG COMBINE 5X9"

## (undated) DEVICE — DRSG TEGADERM 6"X8"

## (undated) DEVICE — DRAIN JACKSON PRATT 10MM FLAT FULL NO TROCAR

## (undated) DEVICE — GLV 8 PROTEXIS (WHITE)

## (undated) DEVICE — DRAPE 1/2 SHEET 40X57"

## (undated) DEVICE — DRSG KLING 4"

## (undated) DEVICE — WARMING BLANKET UPPER ADULT

## (undated) DEVICE — DRSG VAC GRANUFOAM LARGE (BLACK)

## (undated) DEVICE — GLV 7 PROTEXIS (WHITE)

## (undated) DEVICE — VENODYNE/SCD SLEEVE CALF LARGE

## (undated) DEVICE — CANISTER W/GEL INFOVAC 500ML

## (undated) DEVICE — DRSG CURITY GAUZE SPONGE 4 X 4" 12-PLY

## (undated) DEVICE — SPECIMEN CONTAINER 100ML

## (undated) DEVICE — SUT SURGIPRO II 4-0 18" P-12

## (undated) DEVICE — GLV 6.5 PROTEXIS (WHITE)

## (undated) DEVICE — DRSG XEROFORM 5 X 9"

## (undated) DEVICE — GOWN TRIMAX LG

## (undated) DEVICE — PREP CHLORAPREP HI-LITE ORANGE 26ML

## (undated) DEVICE — SPEAR SURG EYE WECK-CELL CELOS

## (undated) DEVICE — DRAPE LIGHT HANDLE COVER (BLUE)

## (undated) DEVICE — DRSG ADAPTIC 3 X 8"

## (undated) DEVICE — POSITIONER FOAM EGG CRATE ULNAR 2PCS (PINK)

## (undated) DEVICE — DRSG XEROFORM 1 X 8"

## (undated) DEVICE — TUBING SUCTION 20FT

## (undated) DEVICE — GLV 7.5 PROTEXIS (WHITE)

## (undated) DEVICE — SOL IRR BAG NS 0.9% 3000ML

## (undated) DEVICE — DRAPE INSTRUMENT POUCH 6.75" X 11"

## (undated) DEVICE — ELCTR 4-DISC 20MM 49" (RED, BLUE, GREEN, BLACK)